# Patient Record
Sex: FEMALE | Race: WHITE | NOT HISPANIC OR LATINO | Employment: FULL TIME | URBAN - METROPOLITAN AREA
[De-identification: names, ages, dates, MRNs, and addresses within clinical notes are randomized per-mention and may not be internally consistent; named-entity substitution may affect disease eponyms.]

---

## 2021-03-31 ENCOUNTER — OFFICE VISIT (OUTPATIENT)
Dept: FAMILY MEDICINE CLINIC | Facility: CLINIC | Age: 51
End: 2021-03-31
Payer: COMMERCIAL

## 2021-03-31 VITALS
HEIGHT: 63 IN | WEIGHT: 94.2 LBS | DIASTOLIC BLOOD PRESSURE: 70 MMHG | SYSTOLIC BLOOD PRESSURE: 100 MMHG | HEART RATE: 66 BPM | OXYGEN SATURATION: 99 % | TEMPERATURE: 98.2 F | BODY MASS INDEX: 16.69 KG/M2

## 2021-03-31 DIAGNOSIS — R13.10 DIFFICULTY SWALLOWING SOLIDS: ICD-10-CM

## 2021-03-31 DIAGNOSIS — E03.9 ACQUIRED HYPOTHYROIDISM: ICD-10-CM

## 2021-03-31 DIAGNOSIS — K13.79 ORAL BLEEDING: ICD-10-CM

## 2021-03-31 DIAGNOSIS — Z00.00 ANNUAL PHYSICAL EXAM: Primary | ICD-10-CM

## 2021-03-31 DIAGNOSIS — R63.6 UNDERWEIGHT: ICD-10-CM

## 2021-03-31 DIAGNOSIS — T78.2XXA ANAPHYLAXIS, INITIAL ENCOUNTER: ICD-10-CM

## 2021-03-31 DIAGNOSIS — Z12.11 COLON CANCER SCREENING: ICD-10-CM

## 2021-03-31 DIAGNOSIS — Z12.31 ENCOUNTER FOR SCREENING MAMMOGRAM FOR MALIGNANT NEOPLASM OF BREAST: ICD-10-CM

## 2021-03-31 PROCEDURE — 99386 PREV VISIT NEW AGE 40-64: CPT | Performed by: FAMILY MEDICINE

## 2021-03-31 PROCEDURE — 99204 OFFICE O/P NEW MOD 45 MIN: CPT | Performed by: FAMILY MEDICINE

## 2021-03-31 PROCEDURE — 3008F BODY MASS INDEX DOCD: CPT | Performed by: FAMILY MEDICINE

## 2021-03-31 PROCEDURE — 3725F SCREEN DEPRESSION PERFORMED: CPT | Performed by: FAMILY MEDICINE

## 2021-03-31 PROCEDURE — 1036F TOBACCO NON-USER: CPT | Performed by: FAMILY MEDICINE

## 2021-03-31 RX ORDER — EPINEPHRINE 0.3 MG/.3ML
0.3 INJECTION SUBCUTANEOUS ONCE
Qty: 0.6 ML | Refills: 0 | Status: SHIPPED | OUTPATIENT
Start: 2021-03-31 | End: 2021-10-18 | Stop reason: SDUPTHER

## 2021-03-31 RX ORDER — ZINC GLUCONATE 50 MG
50 TABLET ORAL 2 TIMES DAILY
COMMUNITY

## 2021-03-31 NOTE — PROGRESS NOTES
Kiet Drew 1970 female MRN: [id number]  [address]    Visit to Establish Care: Family Medicine    Assessment/Plan     Underweight  BMI 16 69 today  Recommend the patient increase oral caloric intake, reduce cardio workouts per week to increase weight, and allow for addition of resistance training    Acquired hypothyroidism  Historically  Obtain new labs     Difficulty swallowing solids  Would consider swallowing study  Will re-eval after completion of labs     Pam was seen today for annual exam and establish care  Diagnoses and all orders for this visit:    Annual physical exam    Encounter for screening mammogram for malignant neoplasm of breast  -     Mammo screening bilateral w 3d & cad; Future    Underweight  -     TSH, 3rd generation with Free T4 reflex; Future  -     Lipid Panel with Direct LDL reflex; Future  -     CBC; Future  -     Comprehensive metabolic panel; Future  -     Vitamin D 25 hydroxy; Future  -     Vitamin B12; Future    Anaphylaxis, initial encounter  -     EPINEPHrine (EPIPEN) 0 3 mg/0 3 mL SOAJ; Inject 0 3 mL (0 3 mg total) into a muscle once for 1 dose    Colon cancer screening  -     Cologuard; Future    Oral bleeding  -     Protime-INR; Future    Acquired hypothyroidism    Difficulty swallowing solids      In addition to the above, the patient was counseled on general preventative health care subjects, including but not limited to:  - Nutrition, healthy weight, aerobic and weight-bearing exercise  - Mental health, social support, and self care  - Advised of the importance of dental hygiene and routine dental visits   - Patient made aware of  services at the office      Future Appointments   Date Time Provider Fernando Carroll   4/5/2021 11:00 AM AN MAMMO 1  W Becki Otero   4/27/2021 11:00 AM MD FAWN Vicente [facility]   4/4/2022  8:00 AM MD FAWN Vicente MD  [facility] [address]  3/31/2021      Please be aware that this note contains text that was dictated and there may be errors pertaining to "sound-alike" words during the dictation process  SUBJECTIVE    HPI:  Dutch Garg is a 44 y o  female who presented to establish care with this family medicine practice  She has several chronic issues:    Hypothyroidism  She states she now follows with a functional medicine specialist, who stopped her levothyroxine  Initially she had some hair loss and increased cold sensitivity, but those symptoms gradually resolved  Now she feels well  She has been a runner since a teen, but now she speed walks to be gentler on her joints  She does 5+ miles daily x 6 days a week  She doesn't do yoga, lifting  She is perimenopausal  Had hot flashes 2 years ago, now just menstrual irregularity every 3-6 months  Does have history ovarian cysts  Did have fertility challenges but was successful with IUI by Dr Raffy Barr  P1 daughter placenta previa  P2 son no complications  She notes at times her food gets stuck in her throat  Only happens with solids, happens with harder substances like nuts and pretzels  Never with liquids  Never chokes  Did also have an episode where she scratched the palate and then had significant bleeding necessitating ER visit  Sensitive to a lot of things, anaphylaxis to several and doesn't have epipen now  Gets rashes if stressed or has exposures  Review of Systems   Constitutional: Negative for activity change, chills, fever and unexpected weight change (difficulty gaining weight)  HENT: Negative for congestion, rhinorrhea and sore throat  Eyes: Negative for visual disturbance  Respiratory: Negative for cough, shortness of breath and wheezing  Cardiovascular: Negative for chest pain and palpitations  Gastrointestinal: Positive for anal bleeding (twice a year associated with firm pellet stools)   Negative for abdominal pain, blood in stool, constipation, diarrhea, nausea and vomiting  Genitourinary: Positive for menstrual problem  Negative for dyspareunia, dysuria and vaginal discharge  Musculoskeletal: Negative for arthralgias and myalgias  Skin: Negative for rash  Neurological: Negative for dizziness, weakness and headaches  Psychiatric/Behavioral: Negative for dysphoric mood  All other systems reviewed and are negative        Historical Information   Past Medical History:   Diagnosis Date    Allergic     Disease of thyroid gland      Past Surgical History:   Procedure Laterality Date    RHINOPLASTY      SINUS SURGERY      WISDOM TOOTH EXTRACTION       Family History   Problem Relation Age of Onset    Diabetes Mother     Thyroid disease Mother     Dementia Father     Heart disease Father     Heart failure Father     Thyroid disease Father     Breast cancer Sister     Stroke Maternal Grandmother     COPD Maternal Grandfather     Dementia Paternal Grandmother     Alzheimer's disease Paternal Grandmother     Colon cancer Neg Hx     Mental illness Neg Hx     Substance Abuse Neg Hx     Depression Neg Hx      Social History     Socioeconomic History    Marital status: /Civil Union     Spouse name: Nick    Number of children: 2    Years of education: Not on file    Highest education level: Not on file   Occupational History    Not on file   Social Needs    Financial resource strain: Not on file    Food insecurity     Worry: Not on file     Inability: Not on file    Transportation needs     Medical: No     Non-medical: No   Tobacco Use    Smoking status: Never Smoker    Smokeless tobacco: Never Used   Substance and Sexual Activity    Alcohol use: Never     Frequency: Never     Binge frequency: Never    Drug use: Never    Sexual activity: Yes     Partners: Male     Birth control/protection: None   Lifestyle    Physical activity     Days per week: 6 days     Minutes per session: 60 min    Stress: Only a little   Relationships    Social connections     Talks on phone: Not on file     Gets together: Not on file     Attends Jainism service: Not on file     Active member of club or organization: Not on file     Attends meetings of clubs or organizations: Not on file     Relationship status:     Intimate partner violence     Fear of current or ex partner: No     Emotionally abused: No     Physically abused: No     Forced sexual activity: No   Other Topics Concern    Not on file   Social History Narrative    Not on file     OB History        2    Para   2    Term   2       0    AB   0    Living   2       SAB   0    TAB   0    Ectopic   0    Multiple   0    Live Births   2                   Medications:      Current Outpatient Medications:     EPINEPHrine (EPIPEN) 0 3 mg/0 3 mL SOAJ, Inject 0 3 mL (0 3 mg total) into a muscle once for 1 dose, Disp: 0 6 mL, Rfl: 0    Allergies   Allergen Reactions    Cinnamon - Food Allergy Anaphylaxis    Coconut Fatty Acids - Food Allergy Anaphylaxis    Fish Allergy - Food Allergy Anaphylaxis     Most Recent Immunizations   Administered Date(s) Administered    H1N1, All Formulations 10/29/2009     OBJECTIVE  Vitals:   Vitals:    21 0846   BP: 100/70   Pulse: 66   Temp: 98 2 °F (36 8 °C)   SpO2: 99%   Weight: 42 7 kg (94 lb 3 2 oz)   Height: 5' 3" (1 6 m)     Wt Readings from Last 3 Encounters:   21 42 7 kg (94 lb 3 2 oz)   11/15/14 45 4 kg (100 lb)     Body mass index is 16 69 kg/m²  BP Readings from Last 3 Encounters:   21 100/70   11/15/14 110/70     Patient's last menstrual period was 2020 (approximate)  Physical Exam  Vitals signs and nursing note reviewed  Constitutional:       General: She is not in acute distress  Appearance: Normal appearance  She is underweight  She is not ill-appearing  HENT:      Head: Normocephalic and atraumatic        Right Ear: Tympanic membrane, ear canal and external ear normal       Left Ear: Tympanic membrane, ear canal and external ear normal       Nose: Nose normal       Mouth/Throat:      Pharynx: Uvula midline  No oropharyngeal exudate  Tonsils: No tonsillar exudate  Eyes:      Conjunctiva/sclera: Conjunctivae normal    Neck:      Thyroid: No thyromegaly  Cardiovascular:      Rate and Rhythm: Normal rate and regular rhythm  Heart sounds: Normal heart sounds  No murmur  Pulmonary:      Effort: Pulmonary effort is normal  No respiratory distress  Breath sounds: Normal breath sounds  No decreased breath sounds, wheezing, rhonchi or rales  Abdominal:      General: Bowel sounds are normal  There is no distension  Palpations: Abdomen is soft  Tenderness: There is no abdominal tenderness  Lymphadenopathy:      Cervical: No cervical adenopathy  Skin:     General: Skin is warm and dry  Capillary Refill: Capillary refill takes less than 2 seconds  Neurological:      Mental Status: She is alert and oriented to person, place, and time  Sensory: No sensory deficit  Motor: No abnormal muscle tone  Deep Tendon Reflexes: Reflexes normal         Lab:  I have personally reviewed all pertinent results  Imaging:  I have personally reviewed all pertinent results

## 2021-03-31 NOTE — ASSESSMENT & PLAN NOTE
BMI 16 69 today  Recommend the patient increase oral caloric intake, reduce cardio workouts per week to increase weight, and allow for addition of resistance training

## 2021-03-31 NOTE — PATIENT INSTRUCTIONS

## 2021-03-31 NOTE — PROGRESS NOTES
[facility]    NAME: Arun Baltazar  AGE: 44 y o  SEX: female  : 1970  DATE: 3/31/2021     Assessment and Plan:   Immunizations and preventive care screenings were discussed with patient today  Appropriate education was printed on patient's after visit summary  Counseling:  Dental Health: discussed importance of regular tooth brushing, flossing, and dental visits  Injury prevention: discussed safety/seat belts, safety helmets, smoke detectors, carbon dioxide detectors, and smoking near bedding or upholstery  · Exercise: the importance of regular exercise/physical activity was discussed  Recommend exercise 3-5 times per week for at least 30 minutes  BMI Counseling: Body mass index is 16 69 kg/m²  The BMI is below normal  Patient advised to gain weight and dietary education for weight gain was provided  Patient referred to PCP due to patient being underweight  Return in about 1 year (around 3/31/2022) for Annual physical      Chief Complaint:     Chief Complaint   Patient presents with    Annual Exam    Establish Care      History of Present Illness:     Adult Annual Physical   Patient here for a comprehensive physical exam  The patient reports problems - see other note  Diet and Physical Activity  · Diet/Nutrition: well balanced diet, limited junk food, consuming 3-5 servings of fruits/vegetables daily and adequate fiber intake  · Exercise: walking, 5-7 times a week on average and 1-2 hours on average  Depression Screening  PHQ-9 Depression Screening    PHQ-9:   Frequency of the following problems over the past two weeks:      Little interest or pleasure in doing things: 0 - not at all  Feeling down, depressed, or hopeless: 0 - not at all  PHQ-2 Score: 0       General Health  · Sleep: sleeps well  · Hearing: normal - bilateral   · Vision: no vision problems  · Dental: regular dental visits  /GYN Health  · Patient is: perimenopausal  · Last menstrual period: Patient's last menstrual period was 11/30/2020 (approximate)  Review of Systems:     Review of Systems   Constitutional: Negative for activity change, chills and fever  HENT: Negative for congestion, rhinorrhea and sore throat  Eyes: Negative for visual disturbance  Respiratory: Negative for cough, shortness of breath and wheezing  Cardiovascular: Negative for chest pain and palpitations  Gastrointestinal: Negative for abdominal pain, blood in stool, constipation, diarrhea, nausea and vomiting  Genitourinary: Negative for dysuria  Musculoskeletal: Negative for arthralgias and myalgias  Skin: Negative for rash  Neurological: Negative for dizziness, weakness and headaches  Psychiatric/Behavioral: Negative for dysphoric mood  All other systems reviewed and are negative       Past Medical History:     Past Medical History:   Diagnosis Date    Allergic     Disease of thyroid gland       Past Surgical History:     Past Surgical History:   Procedure Laterality Date    RHINOPLASTY      SINUS SURGERY      WISDOM TOOTH EXTRACTION        Social History:     E-Cigarette/Vaping    E-Cigarette Use Never User      E-Cigarette/Vaping Substances    Nicotine No     THC No     CBD No     Flavoring No     Other No     Unknown No      Social History     Socioeconomic History    Marital status: /Civil Union     Spouse name: Nick    Number of children: 2    Years of education: None    Highest education level: None   Occupational History    None   Social Needs    Financial resource strain: None    Food insecurity     Worry: None     Inability: None    Transportation needs     Medical: No     Non-medical: No   Tobacco Use    Smoking status: Never Smoker    Smokeless tobacco: Never Used   Substance and Sexual Activity    Alcohol use: Never     Frequency: Never     Binge frequency: Never    Drug use: Never    Sexual activity: Yes     Partners: Male     Birth control/protection: None   Lifestyle    Physical activity     Days per week: 6 days     Minutes per session: 60 min    Stress: Only a little   Relationships    Social connections     Talks on phone: None     Gets together: None     Attends Rastafari service: None     Active member of club or organization: None     Attends meetings of clubs or organizations: None     Relationship status:     Intimate partner violence     Fear of current or ex partner: No     Emotionally abused: No     Physically abused: No     Forced sexual activity: No   Other Topics Concern    None   Social History Narrative    None      Family History:     Family History   Problem Relation Age of Onset    Diabetes Mother     Thyroid disease Mother     Dementia Father     Heart disease Father     Heart failure Father     Thyroid disease Father     Breast cancer Sister     Stroke Maternal Grandmother     COPD Maternal Grandfather     Dementia Paternal Grandmother     Alzheimer's disease Paternal Grandmother     Colon cancer Neg Hx     Mental illness Neg Hx     Substance Abuse Neg Hx     Depression Neg Hx       Current Medications:     Current Outpatient Medications   Medication Sig Dispense Refill    EPINEPHrine (EPIPEN) 0 3 mg/0 3 mL SOAJ Inject 0 3 mL (0 3 mg total) into a muscle once for 1 dose 0 6 mL 0     No current facility-administered medications for this visit  Allergies: Allergies   Allergen Reactions    Cinnamon - Food Allergy Anaphylaxis    Coconut Fatty Acids - Food Allergy Anaphylaxis    Fish Allergy - Food Allergy Anaphylaxis      Physical Exam:     /70   Pulse 66   Temp 98 2 °F (36 8 °C)   Ht 5' 3" (1 6 m)   Wt 42 7 kg (94 lb 3 2 oz)   LMP 11/30/2020 (Approximate)   SpO2 99%   BMI 16 69 kg/m²     BMI Counseling: Body mass index is 16 69 kg/m²  The BMI is below normal  Patient was advised to gain weight   Dietary education for weight gain was provided to the patient today  Physical Exam  Vitals signs and nursing note reviewed  Constitutional:       General: She is not in acute distress  Appearance: Normal appearance  She is underweight  She is not ill-appearing  HENT:      Head: Normocephalic and atraumatic  Right Ear: Tympanic membrane, ear canal and external ear normal  No middle ear effusion  Left Ear: Tympanic membrane, ear canal and external ear normal   No middle ear effusion  Nose: Nose normal  No congestion or rhinorrhea  Mouth/Throat:      Lips: Pink  Mouth: Mucous membranes are moist       Pharynx: Oropharynx is clear  Uvula midline  No oropharyngeal exudate  Tonsils: No tonsillar exudate  Eyes:      General: Lids are normal       Extraocular Movements: Extraocular movements intact  Conjunctiva/sclera: Conjunctivae normal       Pupils: Pupils are equal, round, and reactive to light  Neck:      Thyroid: No thyromegaly  Trachea: No tracheal deviation  Cardiovascular:      Rate and Rhythm: Normal rate and regular rhythm  Pulses: Normal pulses  Heart sounds: Normal heart sounds, S1 normal and S2 normal  No murmur  Pulmonary:      Effort: Pulmonary effort is normal  No respiratory distress  Breath sounds: Normal breath sounds  No decreased breath sounds, wheezing, rhonchi or rales  Abdominal:      General: Bowel sounds are normal  There is no distension  Palpations: Abdomen is soft  Tenderness: There is no abdominal tenderness  Musculoskeletal:      Right lower leg: No edema  Left lower leg: No edema  Lymphadenopathy:      Cervical: No cervical adenopathy  Skin:     General: Skin is warm and dry  Capillary Refill: Capillary refill takes less than 2 seconds  Neurological:      Mental Status: She is alert and oriented to person, place, and time  Cranial Nerves: Cranial nerves are intact        Deep Tendon Reflexes: Reflexes normal       Reflex Scores:       Patellar reflexes are 2+ on the right side and 2+ on the left side  Psychiatric:         Attention and Perception: Attention normal          Mood and Affect: Mood normal          Thought Content: Thought content does not include suicidal ideation          MD Raul KohliSierra Vista Regional [facility] Center

## 2021-04-05 ENCOUNTER — HOSPITAL ENCOUNTER (OUTPATIENT)
Dept: MAMMOGRAPHY | Facility: HOSPITAL | Age: 51
Discharge: HOME | End: 2021-04-05
Payer: COMMERCIAL

## 2021-04-05 VITALS — WEIGHT: 94 LBS | HEIGHT: 63 IN | BODY MASS INDEX: 16.66 KG/M2

## 2021-04-05 DIAGNOSIS — Z12.31 ENCOUNTER FOR SCREENING MAMMOGRAM FOR MALIGNANT NEOPLASM OF BREAST: ICD-10-CM

## 2021-04-05 PROCEDURE — 77063 BREAST TOMOSYNTHESIS BI: CPT

## 2021-04-05 PROCEDURE — 77067 SCR MAMMO BI INCL CAD: CPT

## 2021-04-15 DIAGNOSIS — Z23 ENCOUNTER FOR IMMUNIZATION: ICD-10-CM

## 2021-05-08 ENCOUNTER — IMMUNIZATIONS (OUTPATIENT)
Dept: FAMILY MEDICINE CLINIC | Facility: HOSPITAL | Age: 51
End: 2021-05-08

## 2021-05-08 DIAGNOSIS — Z23 ENCOUNTER FOR IMMUNIZATION: Primary | ICD-10-CM

## 2021-05-08 PROCEDURE — 0001A SARS-COV-2 / COVID-19 MRNA VACCINE (PFIZER-BIONTECH) 30 MCG: CPT

## 2021-05-08 PROCEDURE — 91300 SARS-COV-2 / COVID-19 MRNA VACCINE (PFIZER-BIONTECH) 30 MCG: CPT

## 2021-05-29 ENCOUNTER — IMMUNIZATIONS (OUTPATIENT)
Dept: FAMILY MEDICINE CLINIC | Facility: HOSPITAL | Age: 51
End: 2021-05-29

## 2021-05-29 DIAGNOSIS — Z23 ENCOUNTER FOR IMMUNIZATION: Primary | ICD-10-CM

## 2021-05-29 PROCEDURE — 91300 SARS-COV-2 / COVID-19 MRNA VACCINE (PFIZER-BIONTECH) 30 MCG: CPT

## 2021-05-29 PROCEDURE — 0002A SARS-COV-2 / COVID-19 MRNA VACCINE (PFIZER-BIONTECH) 30 MCG: CPT

## 2021-10-18 ENCOUNTER — ANNUAL EXAM (OUTPATIENT)
Dept: FAMILY MEDICINE CLINIC | Facility: CLINIC | Age: 51
End: 2021-10-18
Payer: COMMERCIAL

## 2021-10-18 VITALS
OXYGEN SATURATION: 100 % | WEIGHT: 92.8 LBS | TEMPERATURE: 96.5 F | SYSTOLIC BLOOD PRESSURE: 110 MMHG | BODY MASS INDEX: 16.44 KG/M2 | HEIGHT: 63 IN | DIASTOLIC BLOOD PRESSURE: 80 MMHG | HEART RATE: 70 BPM

## 2021-10-18 DIAGNOSIS — Z01.411 ENCOUNTER FOR GYNECOLOGICAL EXAMINATION WITH ABNORMAL FINDING: Primary | ICD-10-CM

## 2021-10-18 DIAGNOSIS — Z12.31 BREAST CANCER SCREENING BY MAMMOGRAM: ICD-10-CM

## 2021-10-18 DIAGNOSIS — T78.2XXA ANAPHYLAXIS, INITIAL ENCOUNTER: ICD-10-CM

## 2021-10-18 DIAGNOSIS — N94.10 FEMALE DYSPAREUNIA: ICD-10-CM

## 2021-10-18 DIAGNOSIS — Z12.4 CERVICAL CANCER SCREENING: ICD-10-CM

## 2021-10-18 DIAGNOSIS — K59.00 CONSTIPATION, UNSPECIFIED CONSTIPATION TYPE: ICD-10-CM

## 2021-10-18 PROCEDURE — G0145 SCR C/V CYTO,THINLAYER,RESCR: HCPCS | Performed by: FAMILY MEDICINE

## 2021-10-18 PROCEDURE — 99214 OFFICE O/P EST MOD 30 MIN: CPT | Performed by: FAMILY MEDICINE

## 2021-10-18 PROCEDURE — G0476 HPV COMBO ASSAY CA SCREEN: HCPCS | Performed by: FAMILY MEDICINE

## 2021-10-18 PROCEDURE — 3008F BODY MASS INDEX DOCD: CPT | Performed by: FAMILY MEDICINE

## 2021-10-18 PROCEDURE — 1036F TOBACCO NON-USER: CPT | Performed by: FAMILY MEDICINE

## 2021-10-18 RX ORDER — EPINEPHRINE 0.3 MG/.3ML
0.3 INJECTION SUBCUTANEOUS ONCE
Qty: 0.6 ML | Refills: 0 | Status: SHIPPED | OUTPATIENT
Start: 2021-10-18 | End: 2022-04-04 | Stop reason: SDUPTHER

## 2021-10-20 LAB
HPV HR 12 DNA CVX QL NAA+PROBE: NEGATIVE
HPV16 DNA CVX QL NAA+PROBE: NEGATIVE
HPV18 DNA CVX QL NAA+PROBE: NEGATIVE

## 2021-10-21 LAB
LAB AP GYN PRIMARY INTERPRETATION: NORMAL
Lab: NORMAL

## 2021-12-28 ENCOUNTER — IMMUNIZATIONS (OUTPATIENT)
Dept: FAMILY MEDICINE CLINIC | Facility: HOSPITAL | Age: 51
End: 2021-12-28

## 2021-12-28 DIAGNOSIS — Z23 ENCOUNTER FOR IMMUNIZATION: Primary | ICD-10-CM

## 2021-12-28 PROCEDURE — 0064A COVID-19 MODERNA VACC 0.25 ML BOOSTER: CPT

## 2021-12-28 PROCEDURE — 91306 COVID-19 MODERNA VACC 0.25 ML BOOSTER: CPT

## 2022-01-04 ENCOUNTER — TELEPHONE (OUTPATIENT)
Dept: FAMILY MEDICINE CLINIC | Facility: CLINIC | Age: 52
End: 2022-01-04

## 2022-01-05 ENCOUNTER — OFFICE VISIT (OUTPATIENT)
Dept: FAMILY MEDICINE CLINIC | Facility: CLINIC | Age: 52
End: 2022-01-05
Payer: COMMERCIAL

## 2022-01-05 VITALS
SYSTOLIC BLOOD PRESSURE: 96 MMHG | HEART RATE: 64 BPM | WEIGHT: 95 LBS | BODY MASS INDEX: 16.83 KG/M2 | OXYGEN SATURATION: 96 % | TEMPERATURE: 96.6 F | HEIGHT: 63 IN | DIASTOLIC BLOOD PRESSURE: 60 MMHG

## 2022-01-05 DIAGNOSIS — R00.2 PALPITATION: Primary | ICD-10-CM

## 2022-01-05 PROCEDURE — 93000 ELECTROCARDIOGRAM COMPLETE: CPT | Performed by: NURSE PRACTITIONER

## 2022-01-05 PROCEDURE — 99214 OFFICE O/P EST MOD 30 MIN: CPT | Performed by: NURSE PRACTITIONER

## 2022-01-05 PROCEDURE — 1036F TOBACCO NON-USER: CPT | Performed by: NURSE PRACTITIONER

## 2022-01-05 PROCEDURE — 3008F BODY MASS INDEX DOCD: CPT | Performed by: NURSE PRACTITIONER

## 2022-01-05 NOTE — PROGRESS NOTES
Assessment/Plan:     Diagnoses and all orders for this visit:    Palpitation  -     POCT ECG  -     Holter monitor; Future        Discussed with patient that her ECG was within normal limitations  Discussed with patient plan to obtain a Holter monitor for further evaluate her palpitations  Patient instructed to call if no improvement in 72 hours or symptoms worsen    Subjective:      Patient ID: Zoey Jay is a 46 y o  female  46 y  o female presenting with palpitations for the past few days  Patient reports that she obtained her COVID vaccine and since that time has been having intermittent episodes of palpitations  She first thought it was related to caffeine intake so decreased amount of caffeine  She reports that she gets up in the morning and does her exercises and is fine but once she drinks her half regular and half decaf coffee the palpitations start again  She reports that her  has been having some cardiac epsiodes since the vaccine also  Patient does have some underlaying anxiety flares         Family History   Problem Relation Age of Onset    Diabetes Mother     Thyroid disease Mother     Dementia Father     Heart disease Father     Heart failure Father     Thyroid disease Father     Breast cancer Sister     Stroke Maternal Grandmother     COPD Maternal Grandfather     Dementia Paternal Grandmother     Alzheimer's disease Paternal Grandmother     No Known Problems Daughter     No Known Problems Maternal Aunt     Colon cancer Neg Hx     Mental illness Neg Hx     Substance Abuse Neg Hx     Depression Neg Hx      Social History     Socioeconomic History    Marital status: /Civil Union     Spouse name: Nick    Number of children: 2    Years of education: Not on file    Highest education level: Not on file   Occupational History    Not on file   Tobacco Use    Smoking status: Never Smoker    Smokeless tobacco: Never Used   Vaping Use    Vaping Use: Never used Substance and Sexual Activity    Alcohol use: Never    Drug use: Never    Sexual activity: Yes     Partners: Male     Birth control/protection: None   Other Topics Concern    Not on file   Social History Narrative    Not on file     Social Determinants of Health     Financial Resource Strain: Not on file   Food Insecurity: Not on file   Transportation Needs: No Transportation Needs    Lack of Transportation (Medical): No    Lack of Transportation (Non-Medical): No   Physical Activity: Sufficiently Active    Days of Exercise per Week: 6 days    Minutes of Exercise per Session: 60 min   Stress: No Stress Concern Present    Feeling of Stress :  Only a little   Social Connections: Unknown    Frequency of Communication with Friends and Family: Not on file    Frequency of Social Gatherings with Friends and Family: Not on file    Attends [employer] Services: Not on file    Active Member of Clubs or Organizations: Not on file    Attends Club or Organization Meetings: Not on file    Marital Status:    Intimate Partner Violence: Not At Risk    Fear of Current or Ex-Partner: No    Emotionally Abused: No    Physically Abused: No    Sexually Abused: No   Housing Stability: Not on file     E-Cigarette/Vaping    E-Cigarette Use Never User      E-Cigarette/Vaping Substances    Nicotine No     THC No     CBD No     Flavoring No     Other No     Unknown No      Past Medical History:   Diagnosis Date    Allergic     Disease of thyroid gland      Past Surgical History:   Procedure Laterality Date    BREAST BIOPSY Left     2yrs ago    RHINOPLASTY      SINUS SURGERY      US GUIDED BREAST BIOPSY LEFT COMPLETE Left 5/1/2019    WISDOM TOOTH EXTRACTION       Allergies   Allergen Reactions    Cinnamon - Food Allergy Anaphylaxis    Coconut Fatty Acids - Food Allergy Anaphylaxis    Fish Allergy - Food Allergy Anaphylaxis       Current Outpatient Medications:     Acetylcysteine (NAC PO), Take 1 tablet by mouth daily at bedtime, Disp: , Rfl:     Ascorbic Acid (VITAMIN C PO), Take by mouth Take 1 in the morning and 2 with dinner, Disp: , Rfl:     B Complex Vitamins (B COMPLEX PO), Take by mouth 2 pumps daily, Disp: , Rfl:     BIOTIN PO, Take 1 tablet by mouth daily, Disp: , Rfl:     BLACK CURRANT SEED OIL PO, Take 1 tablet by mouth 2 (two) times a day, Disp: , Rfl:     Cholecalciferol (VITAMIN D3 PO), Take by mouth Take 4 drops daily, Disp: , Rfl:     COLLAGEN PO, Take 2 Scoops by mouth daily, Disp: , Rfl:     ECHINACEA HERB PO, Take 1 tablet by mouth 3 (three) times a day as needed, Disp: , Rfl:     Flax OIL, Take by mouth, Disp: , Rfl:     LUTEIN PO, Take 1 tablet by mouth daily, Disp: , Rfl:     LYSINE ACETATE PO, Take 2 tablets by mouth 2 (two) times a day, Disp: , Rfl:     Multiple Vitamins-Iron (CHLORELLA PO), Take 3 tablets by mouth daily at bedtime, Disp: , Rfl:     Probiotic Product (PROBIOTIC DAILY PO), Take 1 tablet by mouth daily at bedtime, Disp: , Rfl:     QUERCETIN PO, Take 1 tablet by mouth 2 (two) times a day, Disp: , Rfl:     THEANINE PO, Take 1 tablet by mouth daily at bedtime, Disp: , Rfl:     zinc gluconate 50 mg tablet, Take 50 mg by mouth 2 (two) times a day, Disp: , Rfl:     EPINEPHrine (EPIPEN) 0 3 mg/0 3 mL SOAJ, Inject 0 3 mL (0 3 mg total) into a muscle once for 1 dose, Disp: 0 6 mL, Rfl: 0    Review of Systems   Constitutional: Negative for chills, fatigue and fever  HENT: Negative for dental problem, ear pain, hearing loss, nosebleeds, sneezing, sore throat, tinnitus and trouble swallowing  Respiratory: Negative for cough, chest tightness, shortness of breath and wheezing  Cardiovascular: Positive for palpitations  Negative for chest pain and leg swelling  Gastrointestinal: Negative for abdominal distention, abdominal pain, constipation, diarrhea and nausea  Musculoskeletal: Negative for myalgias  Skin: Negative for color change and rash     Neurological: Negative for dizziness, weakness, light-headedness and headaches  Psychiatric/Behavioral: Negative for dysphoric mood and sleep disturbance  The patient is nervous/anxious  Objective:    BP 96/60   Pulse 64   Temp (!) 96 6 °F (35 9 °C)   Ht 5' 3" (1 6 m)   Wt 43 1 kg (95 lb)   SpO2 96%   BMI 16 83 kg/m² (Reviewed)     Physical Exam  Vitals reviewed  Constitutional:       General: She is not in acute distress  Appearance: She is well-developed and well-groomed  She is not ill-appearing  HENT:      Head: Normocephalic and atraumatic  Right Ear: External ear normal       Left Ear: External ear normal       Nose:      Comments: Patient had a facial covering in place as per office protocol    Eyes:      General: Lids are normal       Extraocular Movements: Extraocular movements intact  Conjunctiva/sclera: Conjunctivae normal       Pupils: Pupils are equal, round, and reactive to light  Neck:      Thyroid: No thyromegaly or thyroid tenderness  Trachea: Trachea and phonation normal    Cardiovascular:      Rate and Rhythm: Normal rate and regular rhythm  Heart sounds: Normal heart sounds  Pulmonary:      Effort: Pulmonary effort is normal       Breath sounds: Normal breath sounds  Skin:     General: Skin is warm and dry  Capillary Refill: Capillary refill takes less than 2 seconds  Neurological:      General: No focal deficit present  Mental Status: She is alert and oriented to person, place, and time  Psychiatric:         Mood and Affect: Mood normal          Behavior: Behavior normal  Behavior is cooperative

## 2022-01-06 DIAGNOSIS — R00.2 PALPITATIONS: Primary | ICD-10-CM

## 2022-01-07 ENCOUNTER — APPOINTMENT (OUTPATIENT)
Dept: LAB | Facility: CLINIC | Age: 52
End: 2022-01-07
Payer: COMMERCIAL

## 2022-01-07 DIAGNOSIS — R00.2 PALPITATIONS: ICD-10-CM

## 2022-01-07 LAB
ANION GAP SERPL CALCULATED.3IONS-SCNC: 6 MMOL/L (ref 4–13)
BASOPHILS # BLD AUTO: 0.06 THOUSANDS/ΜL (ref 0–0.1)
BASOPHILS NFR BLD AUTO: 1 % (ref 0–1)
BUN SERPL-MCNC: 21 MG/DL (ref 5–25)
CALCIUM SERPL-MCNC: 11.7 MG/DL (ref 8.3–10.1)
CHLORIDE SERPL-SCNC: 104 MMOL/L (ref 100–108)
CO2 SERPL-SCNC: 28 MMOL/L (ref 21–32)
CREAT SERPL-MCNC: 0.91 MG/DL (ref 0.6–1.3)
EOSINOPHIL # BLD AUTO: 0.1 THOUSAND/ΜL (ref 0–0.61)
EOSINOPHIL NFR BLD AUTO: 2 % (ref 0–6)
ERYTHROCYTE [DISTWIDTH] IN BLOOD BY AUTOMATED COUNT: 12.3 % (ref 11.6–15.1)
GFR SERPL CREATININE-BSD FRML MDRD: 72 ML/MIN/1.73SQ M
GLUCOSE SERPL-MCNC: 70 MG/DL (ref 65–140)
HCT VFR BLD AUTO: 41.8 % (ref 34.8–46.1)
HGB BLD-MCNC: 13.6 G/DL (ref 11.5–15.4)
IMM GRANULOCYTES # BLD AUTO: 0.01 THOUSAND/UL (ref 0–0.2)
IMM GRANULOCYTES NFR BLD AUTO: 0 % (ref 0–2)
LYMPHOCYTES # BLD AUTO: 1.17 THOUSANDS/ΜL (ref 0.6–4.47)
LYMPHOCYTES NFR BLD AUTO: 28 % (ref 14–44)
MCH RBC QN AUTO: 30.3 PG (ref 26.8–34.3)
MCHC RBC AUTO-ENTMCNC: 32.5 G/DL (ref 31.4–37.4)
MCV RBC AUTO: 93 FL (ref 82–98)
MONOCYTES # BLD AUTO: 0.5 THOUSAND/ΜL (ref 0.17–1.22)
MONOCYTES NFR BLD AUTO: 12 % (ref 4–12)
NEUTROPHILS # BLD AUTO: 2.38 THOUSANDS/ΜL (ref 1.85–7.62)
NEUTS SEG NFR BLD AUTO: 57 % (ref 43–75)
NRBC BLD AUTO-RTO: 0 /100 WBCS
PLATELET # BLD AUTO: 249 THOUSANDS/UL (ref 149–390)
PMV BLD AUTO: 10.1 FL (ref 8.9–12.7)
POTASSIUM SERPL-SCNC: 4 MMOL/L (ref 3.5–5.3)
RBC # BLD AUTO: 4.49 MILLION/UL (ref 3.81–5.12)
SODIUM SERPL-SCNC: 138 MMOL/L (ref 136–145)
TSH SERPL DL<=0.05 MIU/L-ACNC: 2.48 UIU/ML (ref 0.36–3.74)
WBC # BLD AUTO: 4.22 THOUSAND/UL (ref 4.31–10.16)

## 2022-01-07 PROCEDURE — 80048 BASIC METABOLIC PNL TOTAL CA: CPT

## 2022-01-07 PROCEDURE — 85025 COMPLETE CBC W/AUTO DIFF WBC: CPT

## 2022-01-07 PROCEDURE — 84443 ASSAY THYROID STIM HORMONE: CPT

## 2022-01-07 PROCEDURE — 36415 COLL VENOUS BLD VENIPUNCTURE: CPT

## 2022-01-12 ENCOUNTER — HOSPITAL ENCOUNTER (OUTPATIENT)
Dept: NON INVASIVE DIAGNOSTICS | Facility: CLINIC | Age: 52
Discharge: HOME | End: 2022-01-12
Payer: COMMERCIAL

## 2022-01-12 DIAGNOSIS — R00.2 PALPITATION: ICD-10-CM

## 2022-01-12 PROCEDURE — 93226 XTRNL ECG REC<48 HR SCAN A/R: CPT

## 2022-01-12 PROCEDURE — 93225 XTRNL ECG REC<48 HRS REC: CPT

## 2022-01-20 ENCOUNTER — TELEPHONE (OUTPATIENT)
Dept: FAMILY MEDICINE CLINIC | Facility: CLINIC | Age: 52
End: 2022-01-20

## 2022-01-21 PROCEDURE — 93227 XTRNL ECG REC<48 HR R&I: CPT | Performed by: INTERNAL MEDICINE

## 2022-01-24 ENCOUNTER — TELEPHONE (OUTPATIENT)
Dept: OTHER | Facility: OTHER | Age: 52
End: 2022-01-24

## 2022-01-24 NOTE — TELEPHONE ENCOUNTER
Pt calling for results from holter monitor on 1/12/22, there is a result note from Dr Faye Sarmiento in there but the pt is unable to see it from her mychart for some reason, can we give her a call back to go over results? Thank you!

## 2022-01-25 NOTE — TELEPHONE ENCOUNTER
Tried calling pt back no answer  Message left to follow up with the office tomorrow with any more questions or concerns

## 2022-03-10 ENCOUNTER — TELEMEDICINE (OUTPATIENT)
Dept: FAMILY MEDICINE CLINIC | Facility: CLINIC | Age: 52
End: 2022-03-10
Payer: COMMERCIAL

## 2022-03-10 DIAGNOSIS — J01.00 ACUTE NON-RECURRENT MAXILLARY SINUSITIS: Primary | ICD-10-CM

## 2022-03-10 PROCEDURE — 99213 OFFICE O/P EST LOW 20 MIN: CPT | Performed by: FAMILY MEDICINE

## 2022-03-10 RX ORDER — DOXYCYCLINE HYCLATE 100 MG/1
100 CAPSULE ORAL EVERY 12 HOURS SCHEDULED
Qty: 14 CAPSULE | Refills: 0 | Status: SHIPPED | OUTPATIENT
Start: 2022-03-10 | End: 2022-03-17

## 2022-03-10 NOTE — PROGRESS NOTES
COVID-19 Outpatient Progress Note    Assessment/Plan:    Problem List Items Addressed This Visit     None      Visit Diagnoses     Acute non-recurrent maxillary sinusitis    -  Primary    Relevant Medications    doxycycline hyclate (VIBRAMYCIN) 100 mg capsule         Disposition:     After clarifying the patient's history, my suspicion for COVID-19 infection is very low  Counseled the patient regarding supportive care  They are to call or return to the office if not improving  I have spent 12 minutes directly with the patient  Greater than 50% of this time was spent in counseling/coordination of care regarding: prognosis, risks and benefits of treatment options, instructions for management, patient and family education, risk factor reductions and impressions  Encounter provider Ronda Butler MD    Provider located at [address]    Recent Visits  No visits were found meeting these conditions  Showing recent visits within past 7 days and meeting all other requirements  Today's Visits  Date Type Provider Dept   03/10/22 Telemedicine Ronda Butler MD [place]g [id number] Madisyn Chiang today's visits and meeting all other requirements  Future Appointments  No visits were found meeting these conditions  Showing future appointments within next 150 days and meeting all other requirements     This virtual check-in was done via [employer] [address] and patient was informed that this is a secure, HIPAA-compliant platform  She agrees to proceed  Patient agrees to participate in a virtual check in via telephone or video visit instead of presenting to the office to address urgent/immediate medical needs  Patient is aware this is a billable service  After connecting through [facility], the patient was identified by name and date of birth   Alver Serve was informed that this was a telemedicine visit and that the exam was being conducted confidentially over secure lines  My office door was closed  No one else was in the room  Bharati Foreman acknowledged consent and understanding of privacy and security of the telemedicine visit  I informed the patient that I have reviewed her record in Epic and presented the opportunity for her to ask any questions regarding the visit today  The patient agreed to participate  Verification of patient location:  Patient is located in the following state in which I hold an active license: [place]    Subjective:   Bharati Foreman is a 46 y o  female who is concerned about COVID-19  Patient's symptoms include chills, fatigue, malaise, sore throat, myalgias and headache  Patient denies fever, congestion, rhinorrhea, anosmia, loss of taste, cough, shortness of breath, chest tightness, abdominal pain, nausea, vomiting and diarrhea  - Date of symptom onset: 2/24/2022      COVID-19 vaccination status: Fully vaccinated with booster    Exposure:   Contact with a person who is under investigation (PUI) for or who is positive for COVID-19 within the last 14 days?: No    Hospitalized recently for fever and/or lower respiratory symptoms?: No      Currently a healthcare worker that is involved in direct patient care?: No      Works in a special setting where the risk of COVID-19 transmission may be high? (this may include long-term care, correctional and assisted facilities; homeless shelters; assisted-living facilities and group homes ): No      Resident in a special setting where the risk of COVID-19 transmission may be high? (this may include long-term care, correctional and assisted facilities; homeless shelters; assisted-living facilities and group homes ): No      Went to [facility] flu and Covid negative  Sinus pain with bending over  [place] Elo[place] started now with similar symptoms and coughing  She also notes she had a few days of eye congestion/green mucus, but this resolved   Notes she thinks she may have had a reaction to amoxicillin in the past      No results found for: [employer], SARSCORONAVI, CORONAVIRUSR, SARSCOVAG, [address]  Past Medical History:   Diagnosis Date    Allergic     Disease of thyroid gland      Past Surgical History:   Procedure Laterality Date    BREAST BIOPSY Left     2yrs ago    RHINOPLASTY      SINUS SURGERY      US GUIDED BREAST BIOPSY LEFT COMPLETE Left 5/1/2019    WISDOM TOOTH EXTRACTION       Current Outpatient Medications   Medication Sig Dispense Refill    Acetylcysteine (NAC PO) Take 1 tablet by mouth daily at bedtime      Ascorbic Acid (VITAMIN C PO) Take by mouth Take 1 in the morning and 2 with dinner      B Complex Vitamins (B COMPLEX PO) Take by mouth 2 pumps daily      BIOTIN PO Take 1 tablet by mouth daily      BLACK CURRANT SEED OIL PO Take 1 tablet by mouth 2 (two) times a day      Cholecalciferol (VITAMIN D3 PO) Take by mouth Take 4 drops daily      COLLAGEN PO Take 2 Scoops by mouth daily      doxycycline hyclate (VIBRAMYCIN) 100 mg capsule Take 1 capsule (100 mg total) by mouth every 12 (twelve) hours for 7 days 14 capsule 0    EPINEPHrine (EPIPEN) 0 3 mg/0 3 mL SOAJ Inject 0 3 mL (0 3 mg total) into a muscle once for 1 dose 0 6 mL 0    LUTEIN PO Take 1 tablet by mouth daily      LYSINE ACETATE PO Take 2 tablets by mouth 2 (two) times a day      Multiple Vitamins-Iron (CHLORELLA PO) Take 3 tablets by mouth daily at bedtime      Probiotic Product (PROBIOTIC DAILY PO) Take 1 tablet by mouth daily at bedtime      QUERCETIN PO Take 1 tablet by mouth 2 (two) times a day      zinc gluconate 50 mg tablet Take 50 mg by mouth 2 (two) times a day       No current facility-administered medications for this visit  Allergies   Allergen Reactions    Cinnamon - Food Allergy Anaphylaxis    Coconut Fatty Acids - Food Allergy Anaphylaxis    Fish Allergy - Food Allergy Anaphylaxis       Review of Systems   Constitutional: Positive for chills and fatigue   Negative for fever    HENT: Positive for sore throat  Negative for congestion and rhinorrhea  Respiratory: Negative for cough, chest tightness and shortness of breath  Gastrointestinal: Negative for abdominal pain, diarrhea, nausea and vomiting  Musculoskeletal: Positive for myalgias  Neurological: Positive for headaches  All other systems reviewed and are negative  Objective: There were no vitals filed for this visit  Physical Exam  Constitutional:       Appearance: Normal appearance  She is not ill-appearing, toxic-appearing or diaphoretic  HENT:      Head: Normocephalic and atraumatic  Eyes:      Conjunctiva/sclera: Conjunctivae normal    Pulmonary:      Effort: Pulmonary effort is normal  No respiratory distress  Skin:     Findings: No rash  Neurological:      General: No focal deficit present  Mental Status: She is alert  Mental status is at baseline  VIRTUAL VISIT DISCLAIMER    Abdullahi Little verbally agrees to participate in [employer]  Pt is aware that [employer] could be limited without vital signs or the ability to perform a full hands-on physical Nhung Wright understands she or the provider may request at any time to terminate the video visit and request the patient to seek care or treatment in person

## 2022-04-04 ENCOUNTER — OFFICE VISIT (OUTPATIENT)
Dept: FAMILY MEDICINE CLINIC | Facility: CLINIC | Age: 52
End: 2022-04-04
Payer: COMMERCIAL

## 2022-04-04 ENCOUNTER — TELEPHONE (OUTPATIENT)
Dept: FAMILY MEDICINE CLINIC | Facility: CLINIC | Age: 52
End: 2022-04-04

## 2022-04-04 VITALS
SYSTOLIC BLOOD PRESSURE: 116 MMHG | TEMPERATURE: 98.3 F | HEART RATE: 68 BPM | BODY MASS INDEX: 17.72 KG/M2 | WEIGHT: 100 LBS | DIASTOLIC BLOOD PRESSURE: 64 MMHG | HEIGHT: 63 IN

## 2022-04-04 DIAGNOSIS — T78.2XXA ANAPHYLAXIS, INITIAL ENCOUNTER: ICD-10-CM

## 2022-04-04 DIAGNOSIS — E44.0 MODERATE PROTEIN-CALORIE MALNUTRITION (HCC): ICD-10-CM

## 2022-04-04 DIAGNOSIS — Z00.00 ANNUAL PHYSICAL EXAM: Primary | ICD-10-CM

## 2022-04-04 DIAGNOSIS — T17.308A CHOKING, INITIAL ENCOUNTER: ICD-10-CM

## 2022-04-04 PROCEDURE — 3725F SCREEN DEPRESSION PERFORMED: CPT | Performed by: FAMILY MEDICINE

## 2022-04-04 PROCEDURE — 99396 PREV VISIT EST AGE 40-64: CPT | Performed by: FAMILY MEDICINE

## 2022-04-04 RX ORDER — EPINEPHRINE 0.3 MG/.3ML
0.3 INJECTION SUBCUTANEOUS ONCE
Qty: 0.6 ML | Refills: 0 | Status: SHIPPED | OUTPATIENT
Start: 2022-04-04 | End: 2022-04-04

## 2022-04-04 NOTE — PATIENT INSTRUCTIONS
Wellness Visit for Adults   AMBULATORY CARE:   A wellness visit  is when you see your healthcare provider to get screened for health problems  Your healthcare provider will also give you advice on how to stay healthy  Write down your questions so you remember to ask them  Ask your healthcare provider how often you should have a wellness visit  What happens at a wellness visit:  Your healthcare provider will ask about your health, and your family history of health problems  This includes high blood pressure, heart disease, and cancer  He or she will ask if you have symptoms that concern you, if you smoke, and about your mood  You may also be asked about your intake of medicines, supplements, food, and alcohol  Any of the following may be done:  · Your weight  will be checked  Your height may also be checked so your body mass index (BMI) can be calculated  Your BMI shows if you are at a healthy weight  · Your blood pressure  and heart rate will be checked  Your temperature may also be checked  · Blood and urine tests  may be done  Blood tests may be done to check your cholesterol levels  Abnormal cholesterol levels increase your risk for heart disease and stroke  You may also need a blood or urine test to check for diabetes if you are at increased risk  Urine tests may be done to look for signs of an infection or kidney disease  · A physical exam  includes checking your heartbeat and lungs with a stethoscope  Your healthcare provider may also check your skin to look for sun damage  · Screening tests  may be recommended  A screening test is done to check for diseases that may not cause symptoms  The screening tests you may need depend on your age, gender, family history, and lifestyle habits  For example, colorectal screening may be recommended if you are 48years old or older  Screening tests you need if you are a woman:   · A Pap smear  is used to screen for cervical cancer   Pap smears are usually done every 3 to 5 years depending on your age  You may need them more often if you have had abnormal Pap smear test results in the past  Ask your healthcare provider how often you should have a Pap smear  · A mammogram  is an x-ray of your breasts to screen for breast cancer  Experts recommend mammograms every 2 years starting at age 48 years  You may need a mammogram at age 52 years or younger if you have an increased risk for breast cancer  Talk to your healthcare provider about when you should start having mammograms and how often you need them  Vaccines you may need:   · Get an influenza vaccine  every year  The influenza vaccine protects you from the flu  Several types of viruses cause the flu  The viruses change over time, so new vaccines are made each year  · Get a tetanus-diphtheria (Td) booster vaccine  every 10 years  This vaccine protects you against tetanus and diphtheria  Tetanus is a severe infection that may cause painful muscle spasms and lockjaw  Diphtheria is a severe bacterial infection that causes a thick covering in the back of your mouth and throat  · Get a human papillomavirus (HPV) vaccine  if you are female and aged 23 to 32 or male 23 to 24 and never received it  This vaccine protects you from HPV infection  HPV is the most common infection spread by sexual contact  HPV may also cause vaginal, penile, and anal cancers  · Get a pneumococcal vaccine  if you are aged 72 years or older  The pneumococcal vaccine is an injection given to protect you from pneumococcal disease  Pneumococcal disease is an infection caused by pneumococcal bacteria  The infection may cause pneumonia, meningitis, or an ear infection  · Get a shingles vaccine  if you are 60 or older, even if you have had shingles before  The shingles vaccine is an injection to protect you from the varicella-zoster virus  This is the same virus that causes chickenpox   Shingles is a painful rash that develops in people who had chickenpox or have been exposed to the virus  How to eat healthy:  My Plate is a model for planning healthy meals  It shows the types and amounts of foods that should go on your plate  Fruits and vegetables make up about half of your plate, and grains and protein make up the other half  A serving of dairy is included on the side of your plate  The amount of calories and serving sizes you need depends on your age, gender, weight, and height  Examples of healthy foods are listed below:  · Eat a variety of vegetables  such as dark green, red, and orange vegetables  You can also include canned vegetables low in sodium (salt) and frozen vegetables without added butter or sauces  · Eat a variety of fresh fruits , canned fruit in 100% juice, frozen fruit, and dried fruit  · Include whole grains  At least half of the grains you eat should be whole grains  Examples include whole-wheat bread, wheat pasta, brown rice, and whole-grain cereals such as oatmeal     · Eat a variety of protein foods such as seafood (fish and shellfish), lean meat, and poultry without skin (turkey and chicken)  Examples of lean meats include pork leg, shoulder, or tenderloin, and beef round, sirloin, tenderloin, and extra lean ground beef  Other protein foods include eggs and egg substitutes, beans, peas, soy products, nuts, and seeds  · Choose low-fat dairy products such as skim or 1% milk or low-fat yogurt, cheese, and cottage cheese  · Limit unhealthy fats  such as butter, hard margarine, and shortening  Exercise:  Exercise at least 30 minutes per day on most days of the week  Some examples of exercise include walking, biking, dancing, and swimming  You can also fit in more physical activity by taking the stairs instead of the elevator or parking farther away from stores  Include muscle strengthening activities 2 days each week  Regular exercise provides many health benefits   It helps you manage your weight, and decreases your risk for type 2 diabetes, heart disease, stroke, and high blood pressure  Exercise can also help improve your mood  Ask your healthcare provider about the best exercise plan for you  General health and safety guidelines:   · Do not smoke  Nicotine and other chemicals in cigarettes and cigars can cause lung damage  Ask your healthcare provider for information if you currently smoke and need help to quit  E-cigarettes or smokeless tobacco still contain nicotine  Talk to your healthcare provider before you use these products  · Limit alcohol  A drink of alcohol is 12 ounces of beer, 5 ounces of wine, or 1½ ounces of liquor  · Lose weight, if needed  Being overweight increases your risk of certain health conditions  These include heart disease, high blood pressure, type 2 diabetes, and certain types of cancer  · Protect your skin  Do not sunbathe or use tanning beds  Use sunscreen with a SPF 15 or higher  Apply sunscreen at least 15 minutes before you go outside  Reapply sunscreen every 2 hours  Wear protective clothing, hats, and sunglasses when you are outside  · Drive safely  Always wear your seatbelt  Make sure everyone in your car wears a seatbelt  A seatbelt can save your life if you are in an accident  Do not use your cell phone when you are driving  This could distract you and cause an accident  Pull over if you need to make a call or send a text message  · Practice safe sex  Use latex condoms if are sexually active and have more than one partner  Your healthcare provider may recommend screening tests for sexually transmitted infections (STIs)  · Wear helmets, lifejackets, and protective gear  Always wear a helmet when you ride a bike or motorcycle, go skiing, or play sports that could cause a head injury  Wear protective equipment when you play sports  Wear a lifejacket when you are on a boat or doing water sports      © Copyright [employer] 2022 Information is for End User's use only and may not be sold, redistributed or otherwise used for commercial purposes  All illustrations and images included in CareNotes® are the copyrighted property of A D A M [employer]  or Derrick Serrano  The above information is an  only  It is not intended as medical advice for individual conditions or treatments  Talk to your doctor, nurse or pharmacist before following any medical regimen to see if it is safe and effective for you  Weight Gain Tips for Athletes   AMBULATORY CARE:   Why some athletes need to gain weight:  Some athletes need more calories to gain weight or maintain their weight  For example, you may need to maintain your weight for endurance sports because of the large amount of calories you burn  Endurance sports include running, swimming, and biking over long time periods or distances  Weight gain may also help to build muscle  This may help you if you participate in contact sports, such as football and hockey  A healthy weight gain goal  is about ½ to 1 pound each week  Gain weight slowly to avoid gaining too much body fat  An exercise program that includes strength training will help you gain muscle weight  Ask your dietitian how much weight gain is right for you  A healthy meal plan for an athlete  includes a variety of healthy foods during regular meals and snacks  The following are suggested amounts of fat, carbohydrate, and protein you may need each day  Your dietitian can tell you how many calories you need each day to gain weight  · Fat  is important because it provides energy and vitamins  You need 20% to 35% of your total daily calories to come from fat  For example, a man who needs about 2900 calories per day would need 725 fat calories each day  There are both healthy fats and unhealthy fats in foods  Ask your healthcare provider for more information about different types of fat and the total amount of fat you should have      · Carbohydrate  is the main source of energy your body uses during exercise  The amount you need depends on your daily calorie needs and the sport that you do  It also depends on whether you are male or female  Athletes need 6 to 10 grams of carbohydrates for each kilogram of body weight  To find your weight in kilograms, divide your weight in pounds by 2 2  Then multiply this number by your carbohydrate needs  For example, if you weigh 70 kilograms, you would need 420 to 700 grams of carbohydrate each day  · Protein  helps to build and repair muscle, produce hormones, boost your immune system, and replace blood cells  The amount of protein you need is only slightly higher than the amount suggested for people who do not exercise  Endurance athletes need 1 2 to 1 4 grams for each kilogram of body weight per day  Athletes who do strength training (such as lifting weights) need 1 2 to 1 7 grams for each kilogram of body weight per day  People can usually meet their needs for protein by following a balanced meal plan  Good sources of protein are lean meats, poultry, eggs, milk, cheese, peanut butter, and beans  Protein or amino acid supplements are not needed for weight gain if you follow a healthy and balanced meal plan  How to increase calories:  You need to eat or drink 500 to 1000 extra calories each day to gain about ½ to 1 pound each week  Your dietitian can tell you how many calories you need each day to gain weight  The following are some ways to add extra calories to your diet:  · Eat every 2 to 3 hours and 30 minutes after you exercise  · Include whole-grain carbohydrates and a lean protein food in each of your meals and snacks  Examples of whole-grain carbohydrates include whole-wheat bread, rolls, and bagels  Examples of lean protein include chicken and turkey  · Add high-calorie foods to your meals  Examples include cheese, peanut butter, avocado, nuts, and granola  · Carry healthy snacks with you   Examples of snacks that provide about 500 calories:    ? 8 saltine crackers, 1 ounce of cheese, and 1 cup of ice cream    ? 1 cup of dry cereal with 1 cup of whole milk, 1 banana, and 1 slice of toast with 1 tablespoon of peanut butter    ? 6 juani cracker squares with 2 tablespoons of peanut butter, 2 tablespoons of raisins, and 1 cup of orange juice    Healthy foods that are higher in calories:   · Choose whole-grain breads, such as honey bran, rye, and pumpernickel instead of white bread  Add peanut butter, margarine, jam, or honey for extra calories  · Eat high-calorie cereals, such as granola and cereals that contain nuts  These are healthy choices and have more calories per serving than puffed rice or corn flakes  The serving size of a cereal is listed on the food label  You can also add more calories to cereals by adding nuts, raisins, and other fruits  · Bananas, pineapple, mangos, raisins, dates, and dried fruit have more calories per serving than watery fruits  Some examples of watery fruits are watermelon, grapefruit, apples, and peaches  Trail mix is a good choice because it contains dried fruits and nuts  · Add margarine, almonds, and cheese to vegetables for extra calories  Stir-frying vegetables with canola or olive oil will also add extra calories  · Cook chicken or fish in a small amount of canola or olive oil  Red meats, such as beef, pork, and lamb, have more calories, but they also have more saturated fat  Saturated fat is an unhealthy type of fat because it may increase blood cholesterol  When you eat red meats, choose leaner cuts  Some examples of lean cuts of red meat are round or sirloin steak, ground round, fresh or boiled ham, or center loin chop  Liquids you should drink:   · You can add calories to your diet by drinking juice, milk, milkshakes, and instant breakfast drinks  Drink plenty of water to prevent dehydration  Dehydration can cause serious health problems   Athletes have higher liquid needs because they lose water through sweat  · Always carry water with you during long exercise sessions  You can wear a special bag or belt made to carry water on your back or around your waist  Drink sports drinks during exercise sessions that last longer than 1 hour  The best way to check if you are drinking enough liquids is to check the color of your urine  Urine should be clear or very light yellow, with little or no smell  If your urine is dark or smells strong, you may not be drinking enough  Follow up with your doctor as directed:  Write down your questions so you remember to ask them during your visits  © Copyright [address] 2022 Information is for End User's use only and may not be sold, redistributed or otherwise used for commercial purposes  All illustrations and images included in CareNotes® are the copyrighted property of [employer] M [employer]  or Derrick Serrano  The above information is an  only  It is not intended as medical advice for individual conditions or treatments  Talk to your doctor, nurse or pharmacist before following any medical regimen to see if it is safe and effective for you

## 2022-04-04 NOTE — TELEPHONE ENCOUNTER
Patient requesting a tetanus shot   She stated she heard she's suppose to get one every 10 years   She has not had one in over 12 years when she had her daughter

## 2022-04-04 NOTE — PROGRESS NOTES
[facility]    NAME: Carrol Kovacs  AGE: 46 y o  SEX: female  : 1970   DATE: 2022     Assessment and Plan:     Problem List Items Addressed This Visit        Other    Anaphylactic syndrome    Relevant Medications    EPINEPHrine (EPIPEN) 0 3 mg/0 3 mL SOAJ      Other Visit Diagnoses     Annual physical exam    -  Primary    Choking, initial encounter        Relevant Orders    FL barium swallow ROUTINE esophagus    Moderate protein-calorie malnutrition ([address] )            Immunizations and preventive care screenings were discussed with patient today  Appropriate education was printed on patient's after visit summary  Counseling:  Dental Health: discussed importance of regular tooth brushing, flossing, and dental visits  · Exercise: the importance of regular exercise/physical activity was discussed  Recommend exercise 3-5 times per week for at least 30 minutes  BMI Counseling: Body mass index is 17 71 kg/m²  The BMI is below normal  Patient advised to gain weight and dietary education for weight gain was provided  Rationale for BMI follow-up plan is due to patient being underweight  Depression Screening and Follow-up Plan: Patient was screened for depression during today's encounter  They screened negative with a PHQ-2 score of 0  BMI Counseling: Body mass index is 17 71 kg/m²  The BMI is below normal  Patient was advised to gain weight  Return in about 1 year (around 2023)  Chief Complaint:     Chief Complaint   Patient presents with    Physical Exam      History of Present Illness:     Adult Annual Physical   This is a very pleasant 46 y o  patient here for a comprehensive physical exam  She is still having the same issue of feeling food is stuck or choking with swallowing solids  Liquids are fine  Anxious regarding weight gain, and fluctuations in weight, bloating through the month   She also notes her wellness practitioner advised she should have 2 BM daily, but she only averages 2 weekly  Diet and Physical Activity  · Diet/Nutrition: well balanced diet, limited junk food, consuming 3-5 servings of fruits/vegetables daily, adequate fiber intake and adequate whole grain intake  · Exercise: moderate cardiovascular exercise, 5-7 times a week on average and 1-2 hours on average  Depression Screening  PHQ-2/9 Depression Screening    Little interest or pleasure in doing things: 0 - not at all  Feeling down, depressed, or hopeless: 0 - not at all  PHQ-2 Score: 0  PHQ-2 Interpretation: Negative depression screen       General Health  · Sleep: sleeps well  · Hearing: normal - bilateral   · Vision: no vision problems  · Dental: regular dental visits  /GYN Health  · Patient is: perimenopausal  · Last menstrual period: approx July 2021     Review of Systems:     Review of Systems   Constitutional: Negative for activity change, chills and fever  HENT: Positive for trouble swallowing  Negative for congestion, rhinorrhea and sore throat  Eyes: Negative for visual disturbance  Respiratory: Negative for cough, shortness of breath and wheezing  Cardiovascular: Negative for chest pain and palpitations  Gastrointestinal: Negative for abdominal pain, blood in stool, constipation, diarrhea, nausea and vomiting  Genitourinary: Negative for dysuria  Musculoskeletal: Negative for arthralgias and myalgias  Skin: Negative for rash  Neurological: Negative for dizziness, weakness and headaches  Psychiatric/Behavioral: Negative for dysphoric mood  All other systems reviewed and are negative       Past Medical History:     Past Medical History:   Diagnosis Date    Allergic     Disease of thyroid gland       Past Surgical History:     Past Surgical History:   Procedure Laterality Date    BREAST BIOPSY Left     2yrs ago    RHINOPLASTY      SINUS SURGERY      US GUIDED BREAST BIOPSY LEFT COMPLETE Left 5/1/2019    WISDOM TOOTH EXTRACTION        Social History:     Social History     Socioeconomic History    Marital status: /Civil Union     Spouse name: Nick    Number of children: 2    Years of education: None    Highest education level: None   Occupational History    None   Tobacco Use    Smoking status: Never Smoker    Smokeless tobacco: Never Used   Vaping Use    Vaping Use: Never used   Substance and Sexual Activity    Alcohol use: Never    Drug use: Never    Sexual activity: Yes     Partners: Male     Birth control/protection: None   Other Topics Concern    None   Social History Narrative    None     Social Determinants of Health     Financial Resource Strain: Not on file   Food Insecurity: Not on file   Transportation Needs: Not on file   Physical Activity: Not on file   Stress: Not on file   Social Connections: Not on file   Intimate Partner Violence: Not on file   Housing Stability: Not on file      Family History:     Family History   Problem Relation Age of Onset    Diabetes Mother     Thyroid disease Mother     Dementia Father     Heart disease Father     Heart failure Father     Thyroid disease Father     Breast cancer Sister     Stroke Maternal Grandmother     COPD Maternal Grandfather     Dementia Paternal Grandmother     Alzheimer's disease Paternal Grandmother     No Known Problems Daughter     No Known Problems Maternal Aunt     Colon cancer Neg Hx     Mental illness Neg Hx     Substance Abuse Neg Hx     Depression Neg Hx       Current Medications:     Current Outpatient Medications   Medication Sig Dispense Refill    Acetylcysteine (NAC PO) Take 1 tablet by mouth daily at bedtime      Ascorbic Acid (VITAMIN C PO) Take by mouth Take 1 in the morning and 2 with dinner      B Complex Vitamins (B COMPLEX PO) Take by mouth 2 pumps daily      BIOTIN PO Take 1 tablet by mouth daily      BLACK CURRANT SEED OIL PO Take 1 tablet by mouth 2 (two) times a day      Cholecalciferol (VITAMIN D3 PO) Take by mouth Take 4 drops daily      COLLAGEN PO Take 2 Scoops by mouth daily      LUTEIN PO Take 1 tablet by mouth daily      LYSINE ACETATE PO Take 2 tablets by mouth 2 (two) times a day      Multiple Vitamins-Iron (CHLORELLA PO) Take 3 tablets by mouth daily at bedtime      Probiotic Product (PROBIOTIC DAILY PO) Take 1 tablet by mouth daily at bedtime      QUERCETIN PO Take 1 tablet by mouth 2 (two) times a day      zinc gluconate 50 mg tablet Take 50 mg by mouth 2 (two) times a day      EPINEPHrine (EPIPEN) 0 3 mg/0 3 mL SOAJ Inject 0 3 mL (0 3 mg total) into a muscle once for 1 dose 0 6 mL 0     No current facility-administered medications for this visit  Allergies: Allergies   Allergen Reactions    Cinnamon - Food Allergy Anaphylaxis    Coconut Fatty Acids - Food Allergy Anaphylaxis    Fish Allergy - Food Allergy Anaphylaxis      Physical Exam:     /64   Pulse 68   Temp 98 3 °F (36 8 °C)   Ht 5' 3" (1 6 m)   Wt 45 4 kg (100 lb)   BMI 17 71 kg/m²   BMI Counseling: Body mass index is 17 71 kg/m²  The BMI is below normal  Patient was advised to gain weight  Physical Exam  Vitals and nursing note reviewed  Constitutional:       General: She is not in acute distress  Appearance: She is well-developed and underweight  She is not ill-appearing  HENT:      Head: Normocephalic and atraumatic  Right Ear: Tympanic membrane, ear canal and external ear normal  No middle ear effusion  Left Ear: Tympanic membrane, ear canal and external ear normal   No middle ear effusion  Nose: Nose normal  No congestion or rhinorrhea  Mouth/Throat:      Lips: Pink  Mouth: Mucous membranes are moist       Pharynx: Oropharynx is clear  Uvula midline  No oropharyngeal exudate  Tonsils: No tonsillar exudate  Eyes:      General: Lids are normal       Extraocular Movements: Extraocular movements intact  Conjunctiva/sclera: Conjunctivae normal       Pupils: Pupils are equal, round, and reactive to light  Neck:      Thyroid: No thyromegaly  Trachea: No tracheal deviation  Cardiovascular:      Rate and Rhythm: Normal rate and regular rhythm  Pulses: Normal pulses  Heart sounds: Normal heart sounds, S1 normal and S2 normal  No murmur heard  Pulmonary:      Effort: Pulmonary effort is normal  No respiratory distress  Breath sounds: Normal breath sounds  No decreased breath sounds, wheezing, rhonchi or rales  Abdominal:      General: Bowel sounds are normal  There is no distension  Palpations: Abdomen is soft  Tenderness: There is no abdominal tenderness  Musculoskeletal:      Right lower leg: No edema  Left lower leg: No edema  Lymphadenopathy:      Cervical: No cervical adenopathy  Skin:     General: Skin is warm and dry  Capillary Refill: Capillary refill takes less than 2 seconds  Neurological:      Mental Status: She is alert and oriented to person, place, and time  Cranial Nerves: Cranial nerves are intact  Deep Tendon Reflexes: Reflexes normal       Reflex Scores:       Patellar reflexes are 2+ on the right side and 2+ on the left side  Psychiatric:         Attention and Perception: Attention normal          Mood and Affect: Mood is anxious  Thought Content: Thought content does not include suicidal ideation         MD Raul ManBanner Behavior[facility] Health Hospital

## 2022-04-12 ENCOUNTER — PATIENT MESSAGE (OUTPATIENT)
Dept: FAMILY MEDICINE CLINIC | Facility: CLINIC | Age: 52
End: 2022-04-12

## 2022-04-12 DIAGNOSIS — J01.10 ACUTE NON-RECURRENT FRONTAL SINUSITIS: Primary | ICD-10-CM

## 2022-04-12 DIAGNOSIS — J34.89 SINUS PRESSURE: Primary | ICD-10-CM

## 2022-04-12 RX ORDER — AMOXICILLIN AND CLAVULANATE POTASSIUM 875; 125 MG/1; MG/1
1 TABLET, FILM COATED ORAL EVERY 12 HOURS SCHEDULED
Qty: 14 TABLET | Refills: 0 | Status: SHIPPED | OUTPATIENT
Start: 2022-04-12 | End: 2022-04-20 | Stop reason: ALTCHOICE

## 2022-04-13 DIAGNOSIS — J01.10 ACUTE NON-RECURRENT FRONTAL SINUSITIS: Primary | ICD-10-CM

## 2022-04-13 RX ORDER — CEFUROXIME AXETIL 500 MG/1
500 TABLET ORAL EVERY 12 HOURS SCHEDULED
Qty: 14 TABLET | Refills: 0 | Status: SHIPPED | OUTPATIENT
Start: 2022-04-13 | End: 2022-04-20

## 2022-04-20 ENCOUNTER — OFFICE VISIT (OUTPATIENT)
Dept: FAMILY MEDICINE CLINIC | Facility: CLINIC | Age: 52
End: 2022-04-20
Payer: COMMERCIAL

## 2022-04-20 VITALS
SYSTOLIC BLOOD PRESSURE: 108 MMHG | WEIGHT: 99 LBS | TEMPERATURE: 95.5 F | HEART RATE: 59 BPM | HEIGHT: 63 IN | OXYGEN SATURATION: 97 % | BODY MASS INDEX: 17.54 KG/M2 | DIASTOLIC BLOOD PRESSURE: 70 MMHG

## 2022-04-20 DIAGNOSIS — Z23 IMMUNIZATION DUE: ICD-10-CM

## 2022-04-20 DIAGNOSIS — S09.90XA INJURY OF HEAD, INITIAL ENCOUNTER: Primary | ICD-10-CM

## 2022-04-20 PROCEDURE — 3008F BODY MASS INDEX DOCD: CPT | Performed by: FAMILY MEDICINE

## 2022-04-20 PROCEDURE — 1036F TOBACCO NON-USER: CPT | Performed by: FAMILY MEDICINE

## 2022-04-20 PROCEDURE — 99213 OFFICE O/P EST LOW 20 MIN: CPT | Performed by: FAMILY MEDICINE

## 2022-04-20 PROCEDURE — 90715 TDAP VACCINE 7 YRS/> IM: CPT

## 2022-04-20 PROCEDURE — 90471 IMMUNIZATION ADMIN: CPT

## 2022-04-20 NOTE — PROGRESS NOTES
Assessment/Plan:      Diagnoses and all orders for this visit:    Injury of head, initial encounter    Immunization due  -     TDAP VACCINE GREATER THAN OR EQUAL TO 6YO IM    Normal neurologic exam  Counseled regarding expected course of healing  Recommend TDAP booster now  Counseled the patient regarding supportive care  Subjective:     Patient ID: Kerline Morrow is a 45 y o  female  Injury  Incident onset: Friday 4/15  [place] ship  The injury mechanism was a direct blow (hit her head on overhead structure inside ship  no visible rust )  No protective equipment was used  There is an injury to the head  The pain is moderate  It is unlikely that a foreign body is present  Associated symptoms include tingling  Pertinent negatives include no abnormal behavior, difficulty breathing, headaches (except pain at site of injury), hearing loss, light-headedness, loss of consciousness, memory loss, nausea, neck pain, numbness, visual disturbance, vomiting or weakness  (Initially black vision for a second, then no subsequent issues  Head pain at site of injury, along with intermittent tingling  Bleeding stopped with pressure and ice) There have been no prior injuries to these areas  Her tetanus status is out of date  Review of Systems   HENT: Negative for hearing loss  Eyes: Negative for visual disturbance  Gastrointestinal: Negative for nausea and vomiting  Musculoskeletal: Negative for neck pain  Neurological: Positive for tingling  Negative for dizziness, tremors, loss of consciousness, syncope, facial asymmetry, speech difficulty, weakness, light-headedness, numbness and headaches (except pain at site of injury)  Psychiatric/Behavioral: Negative for memory loss  All other systems reviewed and are negative  Objective:     Physical Exam  Vitals and nursing note reviewed  Constitutional:       General: She is not in acute distress  Appearance: Normal appearance   She is well-developed  She is not ill-appearing or diaphoretic  HENT:      Head: Normocephalic  Laceration present  Comments: Scabbed over laceration without surrounding erythema or induration  TTP  No palpable step off or hematoma  Right Ear: External ear normal       Left Ear: External ear normal       Nose: Nose normal    Eyes:      General: Lids are normal  No visual field deficit  Extraocular Movements:      Right eye: No nystagmus  Left eye: No nystagmus  Conjunctiva/sclera: Conjunctivae normal       Pupils: Pupils are equal, round, and reactive to light  Neck:      Vascular: No JVD  Trachea: No tracheal deviation  Pulmonary:      Effort: No accessory muscle usage or respiratory distress  Skin:     Findings: No rash  Neurological:      General: No focal deficit present  Mental Status: She is alert and oriented to person, place, and time  GCS: GCS eye subscore is 4  GCS verbal subscore is 5  GCS motor subscore is 6  Cranial Nerves: Cranial nerves are intact  No cranial nerve deficit, dysarthria or facial asymmetry  Sensory: No sensory deficit  Motor: Motor function is intact  No weakness, tremor, abnormal muscle tone or pronator drift  Coordination: Coordination is intact  Romberg sign negative  Coordination normal  Finger-Nose-Finger Test and Heel to RUST Test normal       Gait: Gait normal       Deep Tendon Reflexes: Reflexes are normal and symmetric

## 2022-05-02 ENCOUNTER — OFFICE VISIT (OUTPATIENT)
Dept: FAMILY MEDICINE CLINIC | Facility: CLINIC | Age: 52
End: 2022-05-02
Payer: COMMERCIAL

## 2022-05-02 VITALS
TEMPERATURE: 96.3 F | OXYGEN SATURATION: 100 % | DIASTOLIC BLOOD PRESSURE: 74 MMHG | SYSTOLIC BLOOD PRESSURE: 104 MMHG | WEIGHT: 99.5 LBS | HEIGHT: 63 IN | BODY MASS INDEX: 17.63 KG/M2 | HEART RATE: 64 BPM

## 2022-05-02 DIAGNOSIS — N89.8 VAGINAL ITCHING: Primary | ICD-10-CM

## 2022-05-02 PROCEDURE — 87086 URINE CULTURE/COLONY COUNT: CPT | Performed by: FAMILY MEDICINE

## 2022-05-02 PROCEDURE — 87510 GARDNER VAG DNA DIR PROBE: CPT | Performed by: FAMILY MEDICINE

## 2022-05-02 PROCEDURE — 1036F TOBACCO NON-USER: CPT | Performed by: FAMILY MEDICINE

## 2022-05-02 PROCEDURE — 87660 TRICHOMONAS VAGIN DIR PROBE: CPT | Performed by: FAMILY MEDICINE

## 2022-05-02 PROCEDURE — 99213 OFFICE O/P EST LOW 20 MIN: CPT | Performed by: FAMILY MEDICINE

## 2022-05-02 PROCEDURE — 3008F BODY MASS INDEX DOCD: CPT | Performed by: FAMILY MEDICINE

## 2022-05-02 PROCEDURE — 87480 CANDIDA DNA DIR PROBE: CPT | Performed by: FAMILY MEDICINE

## 2022-05-02 NOTE — PROGRESS NOTES
Assessment/Plan:      Diagnoses and all orders for this visit:    Vaginal itching  -     VAGINOSIS DNA PROBE (AFFIRM); Future  -     Urine culture; Future    Counseled the patient regarding supportive care  They are to call or return to the office if not improving  Subjective:     Patient ID: Melvin Peña is a 45 y o  female  HPI   Urinary frequency, discomfort, sensation of incomplete emptying  She also had a few days of greenish yellow discharge and discomfort  She feels "poofy" down there  She denies fever, nausea, abd pain  Review of Systems   Constitutional: Negative for chills and fever  HENT: Negative for congestion  Respiratory: Negative for cough  Gastrointestinal: Negative for nausea  Genitourinary: Positive for dysuria, frequency, vaginal discharge and vaginal pain  Negative for flank pain  All other systems reviewed and are negative  Objective:     Physical Exam  Vitals and nursing note reviewed  Exam conducted with a chaperone present Fred Calle)  Constitutional:       General: She is not in acute distress  Appearance: Normal appearance  She is well-developed  She is not ill-appearing or diaphoretic  HENT:      Head: Normocephalic and atraumatic  Right Ear: External ear normal       Left Ear: External ear normal       Nose: Nose normal    Eyes:      General: Lids are normal       Conjunctiva/sclera: Conjunctivae normal    Neck:      Vascular: No JVD  Trachea: No tracheal deviation  Pulmonary:      Effort: No accessory muscle usage or respiratory distress  Genitourinary:     Pubic Area: No rash  Labia:         Right: No rash or lesion  Left: No rash or lesion  Comments: White, think discharge  Skin:     Findings: No rash  Neurological:      Mental Status: She is alert

## 2022-05-03 ENCOUNTER — PATIENT MESSAGE (OUTPATIENT)
Dept: FAMILY MEDICINE CLINIC | Facility: CLINIC | Age: 52
End: 2022-05-03

## 2022-05-03 DIAGNOSIS — J34.89 SINUS PRESSURE: ICD-10-CM

## 2022-05-03 LAB — BACTERIA UR CULT: NORMAL

## 2022-05-03 RX ORDER — AMOXICILLIN AND CLAVULANATE POTASSIUM 875; 125 MG/1; MG/1
1 TABLET, FILM COATED ORAL EVERY 12 HOURS SCHEDULED
Qty: 14 TABLET | Refills: 0 | Status: SHIPPED | OUTPATIENT
Start: 2022-05-03 | End: 2022-05-03 | Stop reason: SDUPTHER

## 2022-05-03 RX ORDER — AMOXICILLIN AND CLAVULANATE POTASSIUM 875; 125 MG/1; MG/1
1 TABLET, FILM COATED ORAL EVERY 12 HOURS SCHEDULED
Qty: 14 TABLET | Refills: 0 | Status: SHIPPED | OUTPATIENT
Start: 2022-05-03 | End: 2022-05-10

## 2022-05-04 LAB
CANDIDA RRNA VAG QL PROBE: NEGATIVE
G VAGINALIS RRNA GENITAL QL PROBE: NEGATIVE
T VAGINALIS RRNA GENITAL QL PROBE: NEGATIVE

## 2022-12-23 ENCOUNTER — TELEPHONE (OUTPATIENT)
Dept: FAMILY MEDICINE CLINIC | Facility: CLINIC | Age: 52
End: 2022-12-23

## 2022-12-23 NOTE — TELEPHONE ENCOUNTER
----- Message from Deja Cotto sent at 12/23/2022  8:12 AM EST -----  Regarding: FW: John Sandhu  Contact: [phone]    ----- Message -----  From: Ame Lynn  Sent: 12/22/2022   6:43 PM EST  To: Keaton Sanders [facility]  Subject: John Sandhu                                    And the fact if there was ever an emergency and I needed to provide immunizations, etc  I have no access to that    This is really upsetting

## 2022-12-23 NOTE — TELEPHONE ENCOUNTER
Spoke to patient about her daughter's Negra  Explained the [facility] protocol/ reasoning behind why minors have access to their charts, and made sure that she had proxy to access her daughter's chart  She apologizes if she came off "snippy", but wanted to make sure that in case of emergency that she was able to access daughter's information

## 2023-03-15 ENCOUNTER — TELEPHONE (OUTPATIENT)
Dept: FAMILY MEDICINE CLINIC | Facility: CLINIC | Age: 53
End: 2023-03-15

## 2023-03-15 DIAGNOSIS — T78.2XXA ANAPHYLAXIS, INITIAL ENCOUNTER: ICD-10-CM

## 2023-03-15 RX ORDER — EPINEPHRINE 0.3 MG/.3ML
0.3 INJECTION SUBCUTANEOUS ONCE
Qty: 0.6 ML | Refills: 0 | Status: SHIPPED | OUTPATIENT
Start: 2023-03-15 | End: 2023-03-15

## 2023-03-15 NOTE — TELEPHONE ENCOUNTER
Patient is requesting that you send a script for a new Epi pen to the [facility] on [address]    Her current one will be expiring shortly

## 2023-03-28 ENCOUNTER — OFFICE VISIT (OUTPATIENT)
Dept: FAMILY MEDICINE CLINIC | Facility: CLINIC | Age: 53
End: 2023-03-28

## 2023-03-28 VITALS
HEIGHT: 63 IN | HEART RATE: 67 BPM | OXYGEN SATURATION: 98 % | BODY MASS INDEX: 17.54 KG/M2 | DIASTOLIC BLOOD PRESSURE: 72 MMHG | TEMPERATURE: 97.4 F | WEIGHT: 99 LBS | SYSTOLIC BLOOD PRESSURE: 100 MMHG

## 2023-03-28 DIAGNOSIS — J01.10 ACUTE NON-RECURRENT FRONTAL SINUSITIS: Primary | ICD-10-CM

## 2023-03-28 RX ORDER — AMOXICILLIN AND CLAVULANATE POTASSIUM 875; 125 MG/1; MG/1
1 TABLET, FILM COATED ORAL EVERY 12 HOURS SCHEDULED
Qty: 14 TABLET | Refills: 0 | Status: SHIPPED | OUTPATIENT
Start: 2023-03-28 | End: 2023-04-04

## 2023-03-28 NOTE — PROGRESS NOTES
Name: Bladimir Beyer      : 1970      MRN: [id number]  Encounter Provider: Daylin Kumar MD  Encounter Date: 3/28/2023   Encounter department: [address] [place] [address]     1  Acute non-recurrent frontal sinusitis  -     amoxicillin-clavulanate (AUGMENTIN) 875-125 mg per tablet; Take 1 tablet by mouth every 12 (twelve) hours for 7 days  Counseled the patient regarding supportive care  They are to call or return to the office if not improving  Subjective      Started last night with headache, chills, sinus pressure, body aches, fatigued  Tylenol  Woke up drenched in sweat  Fatigue  Today ear pressure, sore neck, sinus pressure, body aches  Review of Systems   Constitutional: Positive for activity change, appetite change, chills, diaphoresis and fatigue  HENT: Positive for congestion, ear pain, postnasal drip, sinus pressure, sinus pain and sore throat  Musculoskeletal: Positive for myalgias  Neurological: Positive for headaches  All other systems reviewed and are negative        Current Outpatient Medications on File Prior to Visit   Medication Sig   • Ascorbic Acid (VITAMIN C PO) Take by mouth Take 1 in the morning and 2 with dinner   • B Complex Vitamins (B COMPLEX PO) Take by mouth 2 pumps daily   • BIOTIN PO Take 1 tablet by mouth daily   • BLACK CURRANT SEED OIL PO Take 1 tablet by mouth 2 (two) times a day   • Cholecalciferol (VITAMIN D3 PO) Take by mouth Take 4 drops daily   • COLLAGEN PO Take 2 Scoops by mouth daily   • LUTEIN PO Take 1 tablet by mouth daily   • LYSINE ACETATE PO Take 2 tablets by mouth 2 (two) times a day   • Multiple Vitamins-Iron (CHLORELLA PO) Take 3 tablets by mouth daily at bedtime   • Probiotic Product (PROBIOTIC DAILY PO) Take 1 tablet by mouth daily at bedtime   • QUERCETIN PO Take 1 tablet by mouth 2 (two) times a day   • zinc gluconate 50 mg tablet Take 50 mg by mouth 2 (two) times a day   • EPINEPHrine "(EPIPEN) 0 3 mg/0 3 mL SOAJ Inject 0 3 mL (0 3 mg total) into a muscle once for 1 dose (Patient not taking: Reported on 3/28/2023)   • [DISCONTINUED] Acetylcysteine (NAC PO) Take 1 tablet by mouth daily at bedtime (Patient not taking: Reported on 3/28/2023)       Objective     /72   Pulse 67   Temp (!) 97 4 °F (36 3 °C)   Ht 5' 3\" (1 6 m)   Wt 44 9 kg (99 lb)   LMP 07/20/2021   SpO2 98%   BMI 17 54 kg/m²     Physical Exam  Vitals and nursing note reviewed  Constitutional:       General: She is not in acute distress  Appearance: Normal appearance  She is well-developed  She is ill-appearing  She is not diaphoretic  HENT:      Head: Normocephalic and atraumatic  Right Ear: Tympanic membrane and external ear normal       Left Ear: Tympanic membrane and external ear normal       Nose: Congestion present  Right Sinus: Maxillary sinus tenderness present  Left Sinus: Maxillary sinus tenderness present  Eyes:      General: Lids are normal       Conjunctiva/sclera: Conjunctivae normal    Neck:      Vascular: No JVD  Trachea: No tracheal deviation  Cardiovascular:      Rate and Rhythm: Normal rate and regular rhythm  Heart sounds: Normal heart sounds  Pulmonary:      Effort: Pulmonary effort is normal  No accessory muscle usage or respiratory distress  Breath sounds: Normal breath sounds  No rhonchi or rales  Lymphadenopathy:      Cervical: Cervical adenopathy present  Skin:     Findings: No rash  Neurological:      Mental Status: She is alert         Alysa Olivarez MD  "

## 2023-04-24 ENCOUNTER — TELEMEDICINE (OUTPATIENT)
Dept: FAMILY MEDICINE CLINIC | Facility: CLINIC | Age: 53
End: 2023-04-24

## 2023-04-24 DIAGNOSIS — L55.0 SUNBURN OF FIRST DEGREE: Primary | ICD-10-CM

## 2023-04-24 NOTE — PROGRESS NOTES
Virtual Regular Visit    Verification of patient location:    Patient is located at Home in the following state in which I hold an active license [place]      Assessment/Plan:    Problem List Items Addressed This Visit    None  Visit Diagnoses     Sunburn of first degree    -  Primary        Discussed with patient plan to treat with conservative care: continue use of Aloe gel to cool the skin, NSAIDs for inflammation control and ice application as needed  Patient instructed to call if no improvement in 72 hours or symptoms worsen     Reason for visit is   Chief Complaint   Patient presents with   • Virtual Regular Visit          Encounter provider Brittany Casillas [facility]    Provider located at [address] [place] [address] [address] [facility] [address]      Recent Visits  No visits were found meeting these conditions  Showing recent visits within past 7 days and meeting all other requirements  Today's Visits  Date Type Provider Dept   04/24/23 Telemedicine Ange Beltran 429 today's visits and meeting all other requirements  Future Appointments  No visits were found meeting these conditions  Showing future appointments within next 150 days and meeting all other requirements       The patient was identified by name and date of birth  Susanne Arellano was informed that this is a telemedicine visit and that the visit is being conducted through the [facility]  She agrees to proceed     My office door was closed  No one else was in the room  She acknowledged consent and understanding of privacy and security of the video platform  The patient has agreed to participate and understands they can discontinue the visit at any time  Patient is aware this is a billable service  Kolby Navarro is a 52 y o  female     52 y  o female presenting with sunburn on her nose since this weekend   She reports that she went to a Boy  event up at [facility] [address] [facility] the weekend with her grandson and forgot to pack sunscreen  When she returned home she noted that her nose was leaking clear fluid and was swollen  She used neosporin, aloe, Vitamin E, Turmeric, and Advil to treat  She is concerned that she can get skin cancer now, that the sunburn is infected or she caused a severe sunburn or sun poisoning to occur  She did notice small amount of orange drainage after using the neosporin so stopped using  Her main symptoms at present time is the increased redness, dehydration and swelling          Past Medical History:   Diagnosis Date   • Allergic    • Disease of thyroid gland        Past Surgical History:   Procedure Laterality Date   • BREAST BIOPSY Left     2yrs ago   • RHINOPLASTY     • SINUS SURGERY     • US GUIDED BREAST BIOPSY LEFT COMPLETE Left 5/1/2019   • WISDOM TOOTH EXTRACTION         Current Outpatient Medications   Medication Sig Dispense Refill   • Ascorbic Acid (VITAMIN C PO) Take by mouth Take 1 in the morning and 2 with dinner     • B Complex Vitamins (B COMPLEX PO) Take by mouth 2 pumps daily     • BIOTIN PO Take 1 tablet by mouth daily     • BLACK CURRANT SEED OIL PO Take 1 tablet by mouth 2 (two) times a day     • Cholecalciferol (VITAMIN D3 PO) Take by mouth Take 4 drops daily     • COLLAGEN PO Take 2 Scoops by mouth daily     • EPINEPHrine (EPIPEN) 0 3 mg/0 3 mL SOAJ Inject 0 3 mL (0 3 mg total) into a muscle once for 1 dose (Patient not taking: Reported on 3/28/2023) 0 6 mL 0   • LUTEIN PO Take 1 tablet by mouth daily     • LYSINE ACETATE PO Take 2 tablets by mouth 2 (two) times a day     • Multiple Vitamins-Iron (CHLORELLA PO) Take 3 tablets by mouth daily at bedtime     • Probiotic Product (PROBIOTIC DAILY PO) Take 1 tablet by mouth daily at bedtime     • QUERCETIN PO Take 1 tablet by mouth 2 (two) times a day     • zinc gluconate 50 mg tablet Take 50 mg by mouth 2 (two) times a day       No current facility-administered medications for this visit  Allergies   Allergen Reactions   • Cinnamon - Food Allergy Anaphylaxis   • Coconut Fatty Acids - Food Allergy Anaphylaxis   • Fish Allergy - Food Allergy Anaphylaxis       Review of Systems   Constitutional: Negative for chills, fatigue and fever  HENT: Negative  Respiratory: Negative  Cardiovascular: Negative  Gastrointestinal: Negative  Skin:        Sunburn on nose with slight swelling   Neurological: Negative  Psychiatric/Behavioral: Negative  Video Exam    There were no vitals filed for this visit  (Vital signs not performed during visit due to limitations of telemedicine format along with patient's availability to needed equipment)    Physical Exam  Constitutional:       General: She is not in acute distress  Appearance: She is not ill-appearing  HENT:      Head: Normocephalic and atraumatic  Eyes:      Conjunctiva/sclera: Conjunctivae normal       Pupils: Pupils are equal, round, and reactive to light  Cardiovascular:      Rate and Rhythm: Normal rate and regular rhythm  Pulmonary:      Effort: No respiratory distress  Breath sounds: No wheezing  Skin:     Findings: Erythema present  Neurological:      General: No focal deficit present  Mental Status: She is alert and oriented to person, place, and time     Psychiatric:         Mood and Affect: Mood normal          Behavior: Behavior normal         Visit Time  Total Visit Duration: 15

## 2023-05-07 ENCOUNTER — OFFICE VISIT (OUTPATIENT)
Dept: URGENT CARE | Age: 53
End: 2023-05-07

## 2023-05-07 VITALS
HEART RATE: 82 BPM | RESPIRATION RATE: 18 BRPM | OXYGEN SATURATION: 99 % | SYSTOLIC BLOOD PRESSURE: 100 MMHG | TEMPERATURE: 98.6 F | DIASTOLIC BLOOD PRESSURE: 46 MMHG

## 2023-05-07 DIAGNOSIS — B34.9 VIRAL SYNDROME: Primary | ICD-10-CM

## 2023-05-07 DIAGNOSIS — U07.1 COVID: ICD-10-CM

## 2023-05-07 LAB
SARS-COV-2 AG UPPER RESP QL IA: POSITIVE
VALID CONTROL: ABNORMAL

## 2023-05-07 RX ORDER — NIRMATRELVIR AND RITONAVIR 300-100 MG
3 KIT ORAL 2 TIMES DAILY
Qty: 30 TABLET | Refills: 0 | Status: SHIPPED | OUTPATIENT
Start: 2023-05-07 | End: 2023-05-12

## 2023-05-07 NOTE — PROGRESS NOTES
[facility]        NAME: Jerrell Shultz is a 52 y o  female  : 1970    MRN: [id number]  DATE: May 7, 2023  TIME: 10:13 AM    Assessment and Plan   Viral syndrome [B34 9]  1  Viral syndrome  Poct Covid 19 Rapid Antigen Test      2  COVID  nirmatrelvir & ritonavir (Paxlovid, 300/100,) tablet therapy pack      Please begin Paxlovid as directed  May alternate Tylenol and Ibuprofen as needed, OTC decongestants are also acceptable  Patient Instructions   COVID-19 2-dose Vaccines   AMBULATORY CARE:   A COVID-19 2-dose vaccine  is given to help prevent severe illness and hospitalization  The vaccines do not contain the virus that causes COVID-19  This means you are not at risk for getting COVID-19 from a vaccine  Instead, the vaccines teach your immune system to recognize the virus and produce antibodies to fight it  Healthcare providers recommend a COVID-19 vaccine, even if you have already had COVID-19  How the vaccine is given:  The vaccine is given as a shot in your shoulder area  Two doses are needed for full protection  The time between doses varies, depending on the vaccine you receive  Make an appointment to get the second dose at the time recommended by your healthcare provider         What you need to know about COVID-19 vaccine approvals and emergency use authorizations (EUAs): An EUA means the vaccine has not been fully approved but is given because the benefits outweigh the risks  A vaccine may be fully approved for certain ages, such as 12 years or older  The same vaccine may also have an EUA for anyone younger than 16 years  Ask your healthcare provider if you have questions about the different vaccines  What you need to know about booster shots:  A booster shot is an additional dose that helps your immune system continue to protect against severe COVID-19  A booster is recommended for everyone 5 years or older   A second booster is recommended for adults 48 or older and anyone 12 years or older who is immunocompromised  Your healthcare provider can help you schedule boosters  Timing depends on the age of the person getting vaccinated and if he or she is immunocompromised  Reasons you should wait to get the vaccine:   • You are sick or have a fever      • You took acetaminophen or ibuprofen the day you are to get the shot      • You had COVID-19 and received monoclonal antibodies or convalescent plasma  Wait at least 90 days after the end of treatment to get the vaccine      Before you get the vaccine, tell your healthcare provider if:   • You have thrombocytopenia, a blood clotting disorder, or are taking blood thinning medicines      • Your immune system is weakened from medicines such as chemotherapy or steroids      • You know or think you are pregnant  Talk to your obstetrician or doctor about the benefits and risks of the vaccine during pregnancy      • You are breastfeeding      • You have an allergy to any component (part) of the vaccine      • You have a history of allergies      After you get the vaccine: You will be considered fully vaccinated 2 weeks after you get the final dose   This is how long your immune system needs to build a response strong enough to protect you  The following are guidelines to follow after you are fully vaccinated :  • Continue being careful until experts are sure a fully vaccinated person cannot get infected or pass the virus to others  Wear a face covering and stay 6 feet away from others when you are in public  Remember to wash your hands often      • It is considered safe to gather indoors without face coverings or social distancing if everyone there is fully vaccinated      • If you are exposed to the virus, you do not need to isolate or get tested unless you develop symptoms      • Check to see if you need to be tested before you travel  You may also need to quarantine after you return  Some countries require proof of a negative test before you leave  You should also be tested 3 to 5 days after you return from another country      • You may need to wear a face covering (mask) while you travel by plane, bus, or train      Risks of the vaccine: This is a new vaccine  All side effects may not be known  • You may have an allergic reaction to the vaccine      • You may not be able to get a second dose if you had a severe allergic reaction to the first dose  It may be life-threatening      • The vaccine may cause mild symptoms, such as a fever, chills, headache, and muscle aches      • You may develop swelling in and around your heart  Seek help immediately if you have chest pain, shortness of breath, or a fast, fluttering, or pounding heartbeat       • The area where you got the shot may be swollen, sore, or tender  This is usually mild and goes away in a few hours  It may help to move your arm around      • The vaccine may not be as helpful if your immune system is weak      • You may still get infected with the coronavirus after you receive the vaccine      What else you need to know about the vaccine:   • It is possible to become infected even after you get the vaccine  The vaccine helps lower the risk, but no vaccine can completely prevent infection  You may be able to pass the virus to others without knowing you are infected  Continue following social distancing guidelines in your area      • The vaccine does not cause reproductive problems  Your healthcare provider can give you more information about the safety of vaccines before or during pregnancy  The vaccine is not believed to cause fertility problems      • Your DNA will not be changed  The vaccine does not affect the part of cells that control DNA      • Your child may need to be fully vaccinated to attend school  Ask your child's school as soon as possible  Vaccine requirements may vary by area  • It is possible to become infected even after you get the vaccine    The vaccine helps lower the risk, but no vaccine can completely prevent infection  You may be able to pass the virus to others without knowing you are infected  Continue following social distancing guidelines in your area      • The vaccine does not cause reproductive problems  Your healthcare provider can give you more information about the safety of vaccines before or during pregnancy  The vaccine is not believed to cause fertility problems      • Your DNA will not be changed  The vaccine does not affect the part of cells that control DNA      • Your child may need to be fully vaccinated to attend school  Ask your child's school as soon as possible  Vaccine requirements may vary by area      Call your local emergency number (911 in the [address] [place] [address] [address]) if:   • You have signs of a severe allergic reaction, such as trouble breathing, hives, or wheezing      • Your mouth and throat are swollen      • You have chest pain or your heart is beating faster than normal for you      • Your face is red or swollen      • You have hives that spread over your body      Call your doctor or the place where you got the vaccine if:   • You have a fever      • You have any other signs or symptoms that concern you or do not go away      • You have increased pain, redness, or swelling around the area where the shot was given      • You have questions or concerns about the COVID-19 vaccine      Apply a warm compress  to the area where the shot was given to decrease pain and swelling  It may also help to move your arm around  Other ways to prevent coronavirus infection:  Droplets are the most common way all coronaviruses spread  The virus spreads from person to person through talking, singing, coughing, and sneezing  Continue to do the following to keep yourself and others safe, even if you have had the vaccine:  • Wash your hands often throughout the day  Use hand  that contains alcohol if soap and water are not available   Do not touch your eyes, nose, or mouth without washing your hands first  Teach children how to wash their hands and use hand            • A face covering  is recommended when you are in public, such as grocery stores, pharmacies, and on mass transit  You may live in an area that has a face covering mandate  This means you must wear a covering in public  Check the laws in your area before you leave your home  Bring extra coverings with you             Follow up with your doctor as directed:  Write down your questions so you remember to ask them during your visits  © Copyright [employer] 2022 Information is for End User's use only and may not be sold, redistributed or otherwise used for commercial purposes  The above information is an  only  It is not intended as medical advice for individual conditions or treatments  Talk to your doctor, nurse or pharmacist before following any medical regimen to see if it is safe and effective for you  Follow up with PCP in 3-5 days  Proceed to  ER if symptoms worsen  Chief Complaint     Chief Complaint   Patient presents with   • Generalized Body Aches   • Fever   • Headache   • Cold Like Symptoms   • Fatigue   • Sore Throat   • Chills     Patient was exposed to COVID(  was diagnosis on Friday) her symptoms started yesterday         History of Present Illness       Patient is a 58-year-old female with no significant past medical history who presents with daughter for evaluation of body aches, headache, cough, congestion, sore throat since yesterday  She reports that her  tested positive for COVID on Friday  Her 2 children are having similar symptoms  She denies fever, chest pain, palpitations, wheezing, nausea/vomiting/diarrhea  Review of Systems   Review of Systems   Constitutional: Negative for fatigue and fever  HENT: Positive for congestion and sore throat  Negative for ear discharge, ear pain, postnasal drip, rhinorrhea, sinus pressure, sinus pain and sneezing  Eyes: Negative  Negative for pain, discharge, redness and itching  Respiratory: Positive for cough  Negative for apnea, choking, chest tightness, shortness of breath, wheezing and stridor  Cardiovascular: Negative  Negative for chest pain and palpitations  Gastrointestinal: Negative  Negative for diarrhea, nausea and vomiting  Endocrine: Negative  Negative for polydipsia, polyphagia and polyuria  Genitourinary: Negative  Negative for decreased urine volume and flank pain  Musculoskeletal: Positive for myalgias  Negative for arthralgias, back pain, neck pain and neck stiffness  Skin: Negative  Negative for color change and rash  Allergic/Immunologic: Negative  Negative for environmental allergies  Neurological: Negative  Negative for dizziness, facial asymmetry, light-headedness, numbness and headaches  Hematological: Negative  Negative for adenopathy  Psychiatric/Behavioral: Negative            Current Medications       Current Outpatient Medications:   •  nirmatrelvir & ritonavir (Paxlovid, 300/100,) tablet therapy pack, Take 3 tablets by mouth 2 (two) times a day for 5 days Take 2 nirmatrelvir tablets + 1 ritonavir tablet together per dose, Disp: 30 tablet, Rfl: 0  •  Ascorbic Acid (VITAMIN C PO), Take by mouth Take 1 in the morning and 2 with dinner, Disp: , Rfl:   •  B Complex Vitamins (B COMPLEX PO), Take by mouth 2 pumps daily, Disp: , Rfl:   •  BIOTIN PO, Take 1 tablet by mouth daily, Disp: , Rfl:   •  BLACK CURRANT SEED OIL PO, Take 1 tablet by mouth 2 (two) times a day, Disp: , Rfl:   •  Cholecalciferol (VITAMIN D3 PO), Take by mouth Take 4 drops daily, Disp: , Rfl:   •  COLLAGEN PO, Take 2 Scoops by mouth daily, Disp: , Rfl:   •  EPINEPHrine (EPIPEN) 0 3 mg/0 3 mL SOAJ, Inject 0 3 mL (0 3 mg total) into a muscle once for 1 dose (Patient not taking: Reported on 3/28/2023), Disp: 0 6 mL, Rfl: 0  •  LUTEIN PO, Take 1 tablet by mouth daily, Disp: , Rfl:   •  LYSINE ACETATE PO, Take 2 tablets by mouth 2 (two) times a day, Disp: , Rfl:   •  Multiple Vitamins-Iron (CHLORELLA PO), Take 3 tablets by mouth daily at bedtime, Disp: , Rfl:   •  Probiotic Product (PROBIOTIC DAILY PO), Take 1 tablet by mouth daily at bedtime, Disp: , Rfl:   •  QUERCETIN PO, Take 1 tablet by mouth 2 (two) times a day, Disp: , Rfl:   •  zinc gluconate 50 mg tablet, Take 50 mg by mouth 2 (two) times a day, Disp: , Rfl:     Current Allergies     Allergies as of 05/07/2023 - Reviewed 05/07/2023   Allergen Reaction Noted   • Cinnamon - food allergy Anaphylaxis 10/15/2020   • Coconut fatty acids - food allergy Anaphylaxis 10/15/2020   • Fish allergy - food allergy Anaphylaxis 10/15/2020            The following portions of the patient's history were reviewed and updated as appropriate: allergies, current medications, past family history, past medical history, past social history, past surgical history and problem list      Past Medical History:   Diagnosis Date   • Allergic    • Disease of thyroid gland        Past Surgical History:   Procedure Laterality Date   • BREAST BIOPSY Left     2yrs ago   • RHINOPLASTY     • SINUS SURGERY     • US GUIDED BREAST BIOPSY LEFT COMPLETE Left 5/1/2019   • WISDOM TOOTH EXTRACTION         Family History   Problem Relation Age of Onset   • Diabetes Mother    • Thyroid disease Mother    • Dementia Father    • Heart disease Father    • Heart failure Father    • Thyroid disease Father    • Breast cancer Sister    • Stroke Maternal Grandmother    • COPD Maternal Grandfather    • Dementia Paternal Grandmother    • Alzheimer's disease Paternal Grandmother    • No Known Problems Daughter    • No Known Problems Maternal Aunt    • Colon cancer Neg Hx    • Mental illness Neg Hx    • Substance Abuse Neg Hx    • Depression Neg Hx          Medications have been verified          Objective   BP (!) 100/46 (BP Location: Right arm, Patient Position: Sitting, Cuff Size: Standard)   Pulse 82   Temp 98 6 °F (37 °C)   Resp 18   LMP 07/20/2021   SpO2 99%        Physical Exam     Physical Exam  Vitals and nursing note reviewed  Constitutional:       General: She is not in acute distress  Appearance: Normal appearance  She is not ill-appearing, toxic-appearing or diaphoretic  Interventions: She is not intubated  HENT:      Head: Normocephalic and atraumatic  Right Ear: Tympanic membrane and ear canal normal  No drainage, swelling or tenderness  No middle ear effusion  Tympanic membrane is not erythematous  Left Ear: Tympanic membrane and ear canal normal  No drainage, swelling or tenderness  No middle ear effusion  Tympanic membrane is not erythematous  Nose: Nose normal  No congestion or rhinorrhea  Mouth/Throat:      Mouth: Mucous membranes are moist  No oral lesions  Pharynx: Uvula midline  No pharyngeal swelling, oropharyngeal exudate, posterior oropharyngeal erythema or uvula swelling  Tonsils: No tonsillar exudate or tonsillar abscesses  2+ on the right  2+ on the left  Eyes:      Extraocular Movements: Extraocular movements intact  Conjunctiva/sclera: Conjunctivae normal       Pupils: Pupils are equal, round, and reactive to light  Neck:      Thyroid: No thyromegaly  Cardiovascular:      Rate and Rhythm: Normal rate and regular rhythm  Pulses: Normal pulses  Heart sounds: Normal heart sounds, S1 normal and S2 normal  Heart sounds not distant  No murmur heard  No friction rub  No gallop  Pulmonary:      Effort: Pulmonary effort is normal  No tachypnea, bradypnea, accessory muscle usage, prolonged expiration, respiratory distress or retractions  She is not intubated  Breath sounds: Normal breath sounds  No stridor, decreased air movement or transmitted upper airway sounds  No decreased breath sounds, wheezing, rhonchi or rales  Abdominal:      General: Bowel sounds are normal       Palpations: Abdomen is soft  Tenderness:  There is no abdominal tenderness  There is no guarding or rebound  Musculoskeletal:         General: Normal range of motion  Cervical back: Full passive range of motion without pain, normal range of motion and neck supple  No tenderness  No spinous process tenderness or muscular tenderness  Normal range of motion  Lymphadenopathy:      Cervical: No cervical adenopathy  Skin:     General: Skin is warm and dry  Capillary Refill: Capillary refill takes less than 2 seconds  Neurological:      General: No focal deficit present  Mental Status: She is alert and oriented to person, place, and time  Cranial Nerves: No cranial nerve deficit     Psychiatric:         Mood and Affect: Mood normal          Behavior: Behavior normal

## 2023-05-07 NOTE — PATIENT INSTRUCTIONS
Please begin Paxlovid as directed  May alternate Tylenol and Ibuprofen as needed, OTC decongestants are also acceptable

## 2023-07-17 ENCOUNTER — OFFICE VISIT (OUTPATIENT)
Dept: FAMILY MEDICINE CLINIC | Facility: CLINIC | Age: 53
End: 2023-07-17
Payer: COMMERCIAL

## 2023-07-17 VITALS
OXYGEN SATURATION: 99 % | HEIGHT: 63 IN | WEIGHT: 95 LBS | RESPIRATION RATE: 16 BRPM | DIASTOLIC BLOOD PRESSURE: 70 MMHG | TEMPERATURE: 96.3 F | SYSTOLIC BLOOD PRESSURE: 108 MMHG | BODY MASS INDEX: 16.83 KG/M2 | HEART RATE: 64 BPM

## 2023-07-17 DIAGNOSIS — Z12.31 ENCOUNTER FOR SCREENING MAMMOGRAM FOR MALIGNANT NEOPLASM OF BREAST: ICD-10-CM

## 2023-07-17 DIAGNOSIS — E03.9 ACQUIRED HYPOTHYROIDISM: ICD-10-CM

## 2023-07-17 DIAGNOSIS — Z00.00 ANNUAL PHYSICAL EXAM: Primary | ICD-10-CM

## 2023-07-17 PROCEDURE — 99396 PREV VISIT EST AGE 40-64: CPT | Performed by: NURSE PRACTITIONER

## 2023-07-17 NOTE — PROGRESS NOTES
[address] [facility]    NAME: Marciano Flores  AGE: 52 y.o. SEX: female  : 1970     DATE: 2023     Assessment and Plan:     Problem List Items Addressed This Visit        Endocrine    Acquired hypothyroidism    Relevant Orders    TSH, 3rd generation with Free T4 reflex   Other Visit Diagnoses     Annual physical exam    -  Primary    Relevant Orders    Comprehensive metabolic panel    Lipid panel    Encounter for screening mammogram for malignant neoplasm of breast        Relevant Orders    Mammo screening bilateral w 3d & cad          Immunizations and preventive care screenings were discussed with patient today. Appropriate education was printed on patient's after visit summary. Counseling:  Alcohol/drug use: discussed moderation in alcohol intake, the recommendations for healthy alcohol use, and avoidance of illicit drug use. Dental Health: discussed importance of regular tooth brushing, flossing, and dental visits. Injury prevention: discussed safety/seat belts, safety helmets, smoke detectors, carbon dioxide detectors, and smoking near bedding or upholstery. Sexual health: discussed sexually transmitted diseases, partner selection, use of condoms, avoidance of unintended pregnancy, and contraceptive alternatives. Exercise: the importance of regular exercise/physical activity was discussed. Recommend exercise 3-5 times per week for at least 30 minutes. BMI Counseling: Body mass index is 16.83 kg/m². The BMI is below normal. Patient advised to gain weight. Rationale for BMI follow-up plan is due to patient being underweight. Depression Screening and Follow-up Plan: Patient was screened for depression during today's encounter. They screened negative with a PHQ-2 score of 0. Return in about 1 year (around 2024).      Chief Complaint:     Chief Complaint   Patient presents with   • Physical Exam History of Present Illness:     Adult Annual Physical   Patient here for a comprehensive physical exam. The patient reports no problems. Patient recently was laid-off which is happy about it because more time to spend with children. Concerned about shingles and thinking about obtaining the Shingles vaccine but no sure about it. Diet and Physical Activity  Diet/Nutrition: well balanced diet, limited junk food, consuming 3-5 servings of fruits/vegetables daily and adequate whole grain intake. Exercise: moderate cardiovascular exercise, 5-7 times a week on average and 30-60 minutes on average. Depression Screening  PHQ-2/9 Depression Screening    Little interest or pleasure in doing things: 0 - not at all  Feeling down, depressed, or hopeless: 0 - not at all  PHQ-2 Score: 0  PHQ-2 Interpretation: Negative depression screen       General Health  Sleep: sleeps well and gets 7-8 hours of sleep on average. Hearing: normal - bilateral.  Vision: no vision problems. Dental: regular dental visits and brushes teeth twice daily. /GYN Health  Patient is: postmenopausal  Last menstrual period: unknown     Review of Systems:     Review of Systems   Constitutional: Negative for activity change, appetite change and unexpected weight change. HENT: Negative for dental problem, ear pain, hearing loss, nosebleeds, sneezing, sore throat, tinnitus and trouble swallowing. Eyes: Negative for visual disturbance. Respiratory: Negative for cough, chest tightness, shortness of breath and wheezing. Cardiovascular: Negative for chest pain, palpitations and leg swelling. Gastrointestinal: Negative for abdominal distention, abdominal pain, constipation, diarrhea and nausea. Endocrine: Negative for polydipsia, polyphagia and polyuria. Genitourinary: Negative. Musculoskeletal: Negative for arthralgias, back pain, myalgias and neck pain. Skin: Negative for color change and rash.    Allergic/Immunologic: Negative for environmental allergies. Neurological: Negative for dizziness, weakness, light-headedness and headaches. Hematological: Negative. Psychiatric/Behavioral: Negative for dysphoric mood and sleep disturbance. The patient is nervous/anxious. Past Medical History:     Past Medical History:   Diagnosis Date   • Allergic    • Disease of thyroid gland       Past Surgical History:     Past Surgical History:   Procedure Laterality Date   • BREAST BIOPSY Left     2yrs ago   • RHINOPLASTY     • SINUS SURGERY     • US GUIDED BREAST BIOPSY LEFT COMPLETE Left 5/1/2019   • WISDOM TOOTH EXTRACTION        Social History:     Social History     Socioeconomic History   • Marital status: /Civil Union     Spouse name: Nick   • Number of children: 2   • Years of education: None   • Highest education level: None   Occupational History   • None   Tobacco Use   • Smoking status: Never     Passive exposure: Never   • Smokeless tobacco: Never   Vaping Use   • Vaping Use: Never used   Substance and Sexual Activity   • Alcohol use: Never   • Drug use: Never   • Sexual activity: Yes     Partners: Male     Birth control/protection: None   Other Topics Concern   • None   Social History Narrative   • None     Social Determinants of Health     Financial Resource Strain: Not on file   Food Insecurity: Not on file   Transportation Needs: No Transportation Needs (3/31/2021)    PRAPARE - Transportation    • Lack of Transportation (Medical): No    • Lack of Transportation (Non-Medical): No   Physical Activity: Sufficiently Active (3/31/2021)    Exercise Vital Sign    • Days of Exercise per Week: 6 days    • Minutes of Exercise per Session: 60 min   Stress: No Stress Concern Present (3/31/2021)    [facility]    • Feeling of Stress :  Only a little   Social Connections: Unknown (3/31/2021)    Social Connection and Isolation Panel [NHANES]    • Frequency of Communication with Friends and Family: Not on file    • Frequency of Social Gatherings with Friends and Family: Not on file    • Attends Jainism Services: Not on file    • Active Member of Clubs or Organizations: Not on file    • Attends Club or Organization Meetings: Not on file    • Marital Status:    Intimate Partner Violence: Not At Risk (3/31/2021)    Humiliation, Afraid, Rape, and Kick questionnaire    • Fear of Current or Ex-Partner: No    • Emotionally Abused: No    • Physically Abused: No    • Sexually Abused: No   Housing Stability: Not on file      Family History:     Family History   Problem Relation Age of Onset   • Diabetes Mother    • Thyroid disease Mother    • Dementia Father    • Heart disease Father    • Heart failure Father    • Thyroid disease Father    • Breast cancer Sister    • Stroke Maternal Grandmother    • COPD Maternal Grandfather    • Dementia Paternal Grandmother    • Alzheimer's disease Paternal Grandmother    • No Known Problems Daughter    • No Known Problems Maternal Aunt    • Colon cancer Neg Hx    • Mental illness Neg Hx    • Substance Abuse Neg Hx    • Depression Neg Hx       Current Medications:     Current Outpatient Medications   Medication Sig Dispense Refill   • Ascorbic Acid (VITAMIN C PO) Take by mouth Take 1 in the morning and 2 with dinner     • B Complex Vitamins (B COMPLEX PO) Take by mouth 2 pumps daily     • BIOTIN PO Take 1 tablet by mouth daily     • BLACK CURRANT SEED OIL PO Take 1 tablet by mouth 2 (two) times a day     • Cholecalciferol (VITAMIN D3 PO) Take by mouth Take 4 drops daily     • COLLAGEN PO Take 2 Scoops by mouth daily     • EPINEPHrine (EPIPEN) 0.3 mg/0.3 mL SOAJ Inject 0.3 mL (0.3 mg total) into a muscle once for 1 dose 0.6 mL 0   • LUTEIN PO Take 1 tablet by mouth daily     • LYSINE ACETATE PO Take 2 tablets by mouth 2 (two) times a day     • Multiple Vitamins-Iron (CHLORELLA PO) Take 3 tablets by mouth daily at bedtime     • Probiotic Product (PROBIOTIC DAILY PO) Take 1 tablet by mouth daily at bedtime     • QUERCETIN PO Take 1 tablet by mouth 2 (two) times a day     • zinc gluconate 50 mg tablet Take 50 mg by mouth 2 (two) times a day       No current facility-administered medications for this visit. Allergies: Allergies   Allergen Reactions   • Cinnamon - Food Allergy Anaphylaxis   • Coconut Fatty Acids - Food Allergy Anaphylaxis   • Fish Allergy - Food Allergy Anaphylaxis      Physical Exam:     /70 (BP Location: Right arm, Patient Position: Sitting)   Pulse 64   Temp (!) 96.3 °F (35.7 °C)   Resp 16   Ht 5' 3" (1.6 m)   Wt 43.1 kg (95 lb)   LMP 07/02/2021   SpO2 99%   BMI 16.83 kg/m² (Reviewed)    Physical Exam  Vitals reviewed. Constitutional:       General: She is not in acute distress. Appearance: Normal appearance. She is well-developed and well-groomed. She is not ill-appearing. HENT:      Head: Normocephalic and atraumatic. Right Ear: Tympanic membrane, ear canal and external ear normal.      Left Ear: Tympanic membrane, ear canal and external ear normal.      Nose: Nose normal.      Mouth/Throat:      Lips: Pink. Mouth: Mucous membranes are moist.      Pharynx: Oropharynx is clear. Eyes:      General: Lids are normal.      Extraocular Movements: Extraocular movements intact. Conjunctiva/sclera: Conjunctivae normal.      Pupils: Pupils are equal, round, and reactive to light. Neck:      Thyroid: No thyromegaly or thyroid tenderness. Trachea: Trachea and phonation normal.   Cardiovascular:      Rate and Rhythm: Normal rate and regular rhythm. Pulses: Normal pulses. Radial pulses are 2+ on the right side and 2+ on the left side. Dorsalis pedis pulses are 2+ on the right side and 2+ on the left side. Posterior tibial pulses are 2+ on the right side and 2+ on the left side. Heart sounds: Normal heart sounds. No murmur heard.   Pulmonary:      Effort: Pulmonary effort is normal.      Breath sounds: Normal breath sounds. Abdominal:      General: Abdomen is flat. Bowel sounds are normal.      Palpations: Abdomen is soft. Tenderness: There is no abdominal tenderness. Musculoskeletal:         General: Normal range of motion. Cervical back: Neck supple. Right lower leg: No edema. Left lower leg: No edema. Lymphadenopathy:      Cervical: No cervical adenopathy. Skin:     General: Skin is warm and dry. Capillary Refill: Capillary refill takes less than 2 seconds. Neurological:      General: No focal deficit present. Mental Status: She is alert and oriented to person, place, and time. Cranial Nerves: Cranial nerves 2-12 are intact. Psychiatric:         Mood and Affect: Mood is anxious. Behavior: Behavior normal. Behavior is cooperative.           [address]

## 2024-04-04 ENCOUNTER — OFFICE VISIT (OUTPATIENT)
Dept: FAMILY MEDICINE CLINIC | Facility: CLINIC | Age: 54
End: 2024-04-04
Payer: COMMERCIAL

## 2024-04-04 ENCOUNTER — TELEPHONE (OUTPATIENT)
Age: 54
End: 2024-04-04

## 2024-04-04 VITALS
OXYGEN SATURATION: 97 % | TEMPERATURE: 96.7 F | HEART RATE: 74 BPM | DIASTOLIC BLOOD PRESSURE: 74 MMHG | BODY MASS INDEX: 16.89 KG/M2 | HEIGHT: 63 IN | WEIGHT: 95.31 LBS | SYSTOLIC BLOOD PRESSURE: 124 MMHG | RESPIRATION RATE: 16 BRPM

## 2024-04-04 DIAGNOSIS — U07.1 COVID: Primary | ICD-10-CM

## 2024-04-04 LAB
SARS-COV-2 AG UPPER RESP QL IA: POSITIVE
SL AMB POCT RAPID FLU A: NORMAL
SL AMB POCT RAPID FLU B: NORMAL
VALID CONTROL: ABNORMAL

## 2024-04-04 PROCEDURE — 87804 INFLUENZA ASSAY W/OPTIC: CPT | Performed by: FAMILY MEDICINE

## 2024-04-04 PROCEDURE — 99213 OFFICE O/P EST LOW 20 MIN: CPT | Performed by: FAMILY MEDICINE

## 2024-04-04 PROCEDURE — 87811 SARS-COV-2 COVID19 W/OPTIC: CPT | Performed by: FAMILY MEDICINE

## 2024-04-04 RX ORDER — NIRMATRELVIR AND RITONAVIR 300-100 MG
3 KIT ORAL 2 TIMES DAILY
Qty: 30 TABLET | Refills: 0 | Status: SHIPPED | OUTPATIENT
Start: 2024-04-04 | End: 2024-04-09

## 2024-04-04 NOTE — PATIENT INSTRUCTIONS
How to Recover from COVID-19 at Home   WHAT YOU NEED TO KNOW:   COVID-19 can cause a range of symptoms, from mild to severe. If you do not need to be treated in a hospital, you will be given instructions to use at home. You will need to watch for worsening symptoms and seek immediate care if needed. You will also need to stay physically apart from others so you do not spread the virus to anyone. It is not known if a person can be infected with the virus again after recovering from COVID-19. It is also not known if or for how long the virus can continue to be passed to others.  DISCHARGE INSTRUCTIONS:   Call your local emergency number (911 in the [place]) if:   You have trouble breathing or shortness of breath at rest.    You have chest pain or pressure that lasts longer than 5 minutes.    You become confused or hard to wake.    Return to the emergency department if:   The skin on your face, fingers, or toes look blue or darker than usual.      Call your doctor if:   You have a fever.    You have new, returning, or worsening symptoms.    Someone in your home has symptoms of COVID-19.    You have questions or concerns about your condition or care.    Medicines:  You may need any of the following:  Decongestants  help reduce nasal congestion and help you breathe more easily. If you take decongestant pills, they may make you feel restless or cause problems with your sleep. Do not use decongestant sprays for more than a few days.    Cough suppressants  help reduce coughing. Ask your healthcare provider which type of cough medicine is best for you.    NSAIDs , such as ibuprofen, help decrease swelling, pain, and fever. NSAIDs can cause stomach bleeding or kidney problems in certain people. If you take blood thinner medicine, always ask your healthcare provider if NSAIDs are safe for you. Always read the medicine label and follow directions.    Acetaminophen  decreases pain and fever. It is available without a doctor's order.  Ask how much to take and how often to take it. Follow directions. Read the labels of all other medicines you are using to see if they also contain acetaminophen, or ask your doctor or pharmacist. Acetaminophen can cause liver damage if not taken correctly.    Take your medicine as directed.  Contact your healthcare provider if you think your medicine is not helping or if you have side effects. Tell your provider if you are allergic to any medicine. Keep a list of the medicines, vitamins, and herbs you take. Include the amounts, and when and why you take them. Bring the list or the pill bottles to follow-up visits. Carry your medicine list with you in case of an emergency.    To soothe a sore throat,  gargle with warm salt water, or use throat lozenges or a throat spray. Drink more liquids to thin and loosen mucus and to prevent dehydration. Use decongestants or saline drops as directed for nasal congestion.  Keep others safe while you are recovering at home:  Healthcare providers will give you specific instructions to follow. The following are general guidelines to remind you how to keep others safe until you are well:  Wash your hands often.  Use soap and water as much as possible. You can use hand  that contains alcohol if soap and water are not available. Do not share towels with anyone. If you use paper towels, throw them away in a lined trash can kept in your room or area. Use a covered trash can, if possible.         Cover sneezes and coughs.  Turn your face away and cover your mouth and nose with a tissue. Throw the tissue away. Use the bend of your arm if a tissue is not available. Then wash your hands well with soap and water or use hand .    Wear a face covering (mask) around others.  Use a cloth covering with at least 2 layers. You can also create layers by putting a cloth covering over a disposable non-medical mask. Cover your mouth and your nose. The covering should fit snugly against  the bridge of your nose. Securely fasten it under your chin and on the sides of your face.         Do not go out of your home unless it is necessary.  If possible, ask someone who is not infected to go out for groceries, medicines, and household items. Ask your healthcare provider for other ways to have appointments. Some providers offer phone, video, or other types of appointments. If you need to be seen in person, call ahead to make sure the office will be ready for you.    Do not let anyone into your home, room, or area unless it is necessary.  If possible, stay in a separate area or room of your home if you live with others. No one should go into the area or room except to give you care. Wear a face covering around others. Remind others to wear face coverings and to wash their hands.     Talk to your healthcare provider about your baby.  If possible, ask someone who is well to care for your baby. You can put breast milk in bottles for the person to use, if needed. Wear a clean face covering if you need to breastfeed or express or pump breast milk. Tell your provider if you have any questions or concerns about caring for or bonding with your baby. He or she will tell you when to bring your baby in for check-ups and vaccines. He or she will also tell you what to do if you think your baby was infected with the coronavirus.    Follow directions from your healthcare provider for when you can be around others.  Your provider will give you specific instructions. In general, you will need to wait 5 to 10 days after symptoms started or you got a positive test result. Wait even if you got a COVID-19 vaccine and 1 or 2 boosters. You can still be infected after you are vaccinated. You may spread the virus to others without knowing you are infected. Do not  travel until your provider says it is okay.       Follow up with your doctor as directed:  Write down your questions so you remember to ask them during your visits.  For  more information:   Centers for Disease Control and Prevention  [address] [place] [address]  Phone: 8- 148 - 224-5906  Web Address: http://www.cdc.gov    © Copyright Merative 2023 Information is for End User's use only and may not be sold, redistributed or otherwise used for commercial purposes.  The above information is an  only. It is not intended as medical advice for individual conditions or treatments. Talk to your doctor, nurse or pharmacist before following any medical regimen to see if it is safe and effective for you.

## 2024-04-04 NOTE — LETTER
April 4, 2024     Patient: Pam Grey   YOB: 1970   Date of Visit: 4/4/2024       To Whom It May Concern:    It is my medical opinion that Pam Grey should remain out of work until 4/8/24 .    If you have any questions or concerns, please don't hesitate to call.         Sincerely,        Pierce Torres MD    CC: No Recipients
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
## 2024-04-04 NOTE — TELEPHONE ENCOUNTER
Pt called stating her pharmacy does not have the Paxlovid script from today's visit, please review and send to the [id number]

## 2024-04-04 NOTE — TELEPHONE ENCOUNTER
I called pharmacy and prescription was received and it is ready for . Patient was advised.      She also wanted to know when you think it would be ok for her to start exercising/running again?

## 2024-04-08 ENCOUNTER — PATIENT MESSAGE (OUTPATIENT)
Dept: FAMILY MEDICINE CLINIC | Facility: CLINIC | Age: 54
End: 2024-04-08

## 2024-04-08 DIAGNOSIS — T78.2XXA ANAPHYLAXIS, INITIAL ENCOUNTER: ICD-10-CM

## 2024-04-17 ENCOUNTER — TELEPHONE (OUTPATIENT)
Age: 54
End: 2024-04-17

## 2024-04-17 DIAGNOSIS — T78.2XXA ANAPHYLAXIS, INITIAL ENCOUNTER: ICD-10-CM

## 2024-04-17 RX ORDER — EPINEPHRINE 0.3 MG/.3ML
0.3 INJECTION SUBCUTANEOUS ONCE
Qty: 0.6 ML | Refills: 0 | Status: SHIPPED | OUTPATIENT
Start: 2024-04-17 | End: 2024-04-18 | Stop reason: SDUPTHER

## 2024-04-17 NOTE — TELEPHONE ENCOUNTER
The patient called [facility] told her the epipen needs pre certification   please call the patient when it is approved so she can go pick it up   thank you

## 2024-04-18 RX ORDER — EPINEPHRINE 0.3 MG/.3ML
0.3 INJECTION SUBCUTANEOUS ONCE
Qty: 0.6 ML | Refills: 0 | Status: SHIPPED | OUTPATIENT
Start: 2024-04-18 | End: 2024-04-18

## 2024-04-18 NOTE — TELEPHONE ENCOUNTER
Pam called in for an update on the authorization For her Epipen. She stated that she would call the insurance company her self as well because her Epipen has been  for a year. She would like to be called once authorized. Please advise thank you.

## 2024-04-18 NOTE — TELEPHONE ENCOUNTER
Unfortunately, we do not do prior auths in the office. The original message was sent to the prior auth pool to initiate. Can someone please assist and begin auth for Epi-pen. If you could kindly keep the patient updated.

## 2024-04-18 NOTE — TELEPHONE ENCOUNTER
Sandy from [facility] called and states if generic epipen is sent to pharmacy does not require a prior authorization. If there is any questions Sandy can be reached at [phone]

## 2024-07-18 ENCOUNTER — RA CDI HCC (OUTPATIENT)
Dept: OTHER | Facility: HOSPITAL | Age: 54
End: 2024-07-18

## 2024-10-01 ENCOUNTER — TELEPHONE (OUTPATIENT)
Age: 54
End: 2024-10-01

## 2024-10-01 ENCOUNTER — PROCEDURE VISIT (OUTPATIENT)
Dept: FAMILY MEDICINE CLINIC | Facility: CLINIC | Age: 54
End: 2024-10-01

## 2024-10-01 VITALS
HEART RATE: 65 BPM | SYSTOLIC BLOOD PRESSURE: 106 MMHG | BODY MASS INDEX: 16.57 KG/M2 | TEMPERATURE: 97.6 F | HEIGHT: 63 IN | OXYGEN SATURATION: 93 % | DIASTOLIC BLOOD PRESSURE: 64 MMHG | WEIGHT: 93.5 LBS

## 2024-10-01 DIAGNOSIS — R30.0 DYSURIA: ICD-10-CM

## 2024-10-01 DIAGNOSIS — Z11.59 ENCOUNTER FOR HEPATITIS C SCREENING TEST FOR LOW RISK PATIENT: ICD-10-CM

## 2024-10-01 DIAGNOSIS — Z01.419 WELL FEMALE EXAM WITH ROUTINE GYNECOLOGICAL EXAM: Primary | ICD-10-CM

## 2024-10-01 DIAGNOSIS — N95.2 VAGINAL ATROPHY: ICD-10-CM

## 2024-10-01 DIAGNOSIS — Z12.4 CERVICAL CANCER SCREENING: ICD-10-CM

## 2024-10-01 DIAGNOSIS — R63.4 UNINTENDED WEIGHT LOSS: ICD-10-CM

## 2024-10-01 DIAGNOSIS — Z12.31 BREAST CANCER SCREENING BY MAMMOGRAM: ICD-10-CM

## 2024-10-01 DIAGNOSIS — Z12.11 COLON CANCER SCREENING: ICD-10-CM

## 2024-10-01 LAB
SL AMB  POCT GLUCOSE, UA: NORMAL
SL AMB LEUKOCYTE ESTERASE,UA: NORMAL
SL AMB POCT BILIRUBIN,UA: NORMAL
SL AMB POCT BLOOD,UA: NORMAL
SL AMB POCT CLARITY,UA: CLEAR
SL AMB POCT COLOR,UA: YELLOW
SL AMB POCT KETONES,UA: NORMAL
SL AMB POCT NITRITE,UA: NORMAL
SL AMB POCT PH,UA: 6
SL AMB POCT SPECIFIC GRAVITY,UA: 1.01
SL AMB POCT URINE PROTEIN: NORMAL
SL AMB POCT UROBILINOGEN: NORMAL

## 2024-10-01 PROCEDURE — G0145 SCR C/V CYTO,THINLAYER,RESCR: HCPCS | Performed by: FAMILY MEDICINE

## 2024-10-01 PROCEDURE — G0476 HPV COMBO ASSAY CA SCREEN: HCPCS | Performed by: FAMILY MEDICINE

## 2024-10-01 NOTE — LETTER
October 1, 2024     Patient: Pam Grey   YOB: 1970   Date of Visit: 10/1/2024       To Whom It May Concern:    Based on her physical dated 10/1/2024, Pam Grey appears to be in good health.    If you have any questions or concerns, please don't hesitate to call.         Sincerely,        Pierce Torres MD    CC: No Recipients

## 2024-10-01 NOTE — PROGRESS NOTES
Adult Annual Physical  Name: Pam Grey      : 1970      MRN: [id number]  Encounter Provider: Pierce Torres MD  Encounter Date: 10/1/2024   Encounter department: [facility]    Assessment & Plan  Well female exam with routine gynecological exam    Orders:    Lipid Panel with Direct LDL reflex; Future    CBC; Future    Comprehensive metabolic panel; Future    Encounter for hepatitis C screening test for low risk patient    Orders:    Hepatitis C antibody; Future    Unintended weight loss    Orders:    TSH, 3rd generation with Free T4 reflex; Future    Breast cancer screening by mammogram    Orders:    Mammo screening bilateral w 3d and cad; Future    Cervical cancer screening    Orders:    Liquid-based pap, screening; Future    Liquid-based pap, screening    HPV High Risk    Colon cancer screening    Orders:    Cologuard    Dysuria    Orders:    POCT urine dip    Vaginal atrophy  Discussed possible treatment with topical estrogen, declines for now        Immunizations and preventive care screenings were discussed with patient today. Appropriate education was printed on patient's after visit summary.    Counseling:  Alcohol/drug use: discussed moderation in alcohol intake, the recommendations for healthy alcohol use, and avoidance of illicit drug use.  Dental Health: discussed importance of regular tooth brushing, flossing, and dental visits.  Exercise: the importance of regular exercise/physical activity was discussed. Recommend exercise 3-5 times per week for at least 30 minutes.          History of Present Illness     Adult Annual Physical:  Patient presents for annual physical.     Diet and Physical Activity:  - Diet/Nutrition: consuming 3-5 servings of fruits/vegetables daily and well balanced diet.  - Exercise: strength training exercises, moderate cardiovascular exercise and 5-7 times a week on average.    Depression Screening:  - PHQ-2 Score: 0    General  "Health:  - Sleep: sleeps well.  - Hearing: normal hearing bilateral ears.  - Vision: goes for regular eye exams, wears glasses and wears contacts. dry eye, uses [facility]. Restasis not covered well.  - Dental: regular dental visits.    Review of Systems   Constitutional:  Negative for chills and fever.   HENT:  Negative for congestion and sore throat.    Eyes:  Negative for pain and visual disturbance.   Respiratory:  Negative for cough and shortness of breath.    Cardiovascular:  Negative for chest pain and palpitations.   Gastrointestinal:  Negative for abdominal pain and nausea.   Genitourinary:  Negative for dysuria.   Musculoskeletal:  Negative for arthralgias and myalgias.   Skin:  Negative for rash and wound.   Neurological:  Negative for dizziness and headaches.   All other systems reviewed and are negative.    Medical History Reviewed by provider this encounter:         Objective     /64   Pulse 65   Temp 97.6 °F (36.4 °C)   Ht 5' 3.39\" (1.61 m)   Wt 42.4 kg (93 lb 8 oz)   LMP 07/02/2021   SpO2 93%   BMI 16.36 kg/m²     Physical Exam  Vitals and nursing note reviewed. Exam conducted with a chaperone present.   Constitutional:       General: She is not in acute distress.     Appearance: She is underweight. She is not ill-appearing.   HENT:      Head: Normocephalic and atraumatic.      Right Ear: Tympanic membrane, ear canal and external ear normal. No middle ear effusion.      Left Ear: Tympanic membrane, ear canal and external ear normal.  No middle ear effusion.      Nose: Nose normal. No congestion or rhinorrhea.      Mouth/Throat:      Lips: Pink.      Mouth: Mucous membranes are moist.      Pharynx: Oropharynx is clear. Uvula midline. No oropharyngeal exudate.      Tonsils: No tonsillar exudate.   Eyes:      General: Lids are normal.      Extraocular Movements: Extraocular movements intact.      Conjunctiva/sclera: Conjunctivae normal.      Pupils: Pupils are equal, round, and reactive to " light.   Neck:      Thyroid: No thyromegaly.      Trachea: No tracheal deviation.   Cardiovascular:      Rate and Rhythm: Normal rate and regular rhythm.      Pulses: Normal pulses.      Heart sounds: Normal heart sounds, S1 normal and S2 normal. No murmur heard.  Pulmonary:      Effort: Pulmonary effort is normal. No respiratory distress.      Breath sounds: Normal breath sounds. No decreased breath sounds, wheezing, rhonchi or rales.   Abdominal:      General: Bowel sounds are normal. There is no distension.      Palpations: Abdomen is soft.      Tenderness: There is no abdominal tenderness.   Genitourinary:     Labia:         Right: No lesion.         Left: No lesion.       Vagina: Normal.      Cervix: Normal.      Uterus: Normal.       Adnexa: Right adnexa normal.      Rectum: No external hemorrhoid.   Musculoskeletal:      Right lower leg: No edema.      Left lower leg: No edema.   Lymphadenopathy:      Cervical: No cervical adenopathy.   Skin:     General: Skin is warm and dry.      Capillary Refill: Capillary refill takes less than 2 seconds.   Neurological:      Mental Status: She is alert and oriented to person, place, and time.      Deep Tendon Reflexes: Reflexes normal.      Reflex Scores:       Patellar reflexes are 2+ on the right side and 2+ on the left side.  Psychiatric:         Attention and Perception: Attention normal.         Mood and Affect: Mood normal.         Thought Content: Thought content does not include suicidal ideation.

## 2024-10-01 NOTE — TELEPHONE ENCOUNTER
Patient called asking if today's PAP/Physical and Shingles vaccine are able to be billed as preventative, so they are covered by her insurance.

## 2024-10-04 ENCOUNTER — CLINICAL SUPPORT (OUTPATIENT)
Dept: FAMILY MEDICINE CLINIC | Facility: CLINIC | Age: 54
End: 2024-10-04
Payer: COMMERCIAL

## 2024-10-04 DIAGNOSIS — Z23 IMMUNIZATION DUE: Primary | ICD-10-CM

## 2024-10-04 LAB
LAB AP GYN PRIMARY INTERPRETATION: NORMAL
Lab: NORMAL

## 2024-10-04 PROCEDURE — 90750 HZV VACC RECOMBINANT IM: CPT

## 2024-10-04 PROCEDURE — 90471 IMMUNIZATION ADMIN: CPT

## 2024-10-07 ENCOUNTER — NURSE TRIAGE (OUTPATIENT)
Age: 54
End: 2024-10-07

## 2024-10-07 NOTE — TELEPHONE ENCOUNTER
"Pt had her shingles vaccine on 10/04/2024.  She has redness and swelling at the injection site, fatigue, low grade fever.  All these symptoms are improving.  She also c/o itching at the injection site, her hands and her legs.  There is no visible rash.  She is using Benadryl and that is helping.  She denies any oral swelling or difficulty breathing, she did have a metallic taste in her mouth the first day after the vaccine.  She wants to make sure that this does not sound like an allergy to the vaccine and that she will be ok to get the second vaccine in March.  Please advise.     Reason for Disposition   Immunization reactions, questions about    Answer Assessment - Initial Assessment Questions  1. SYMPTOMS: \"What is the main symptom?\" (e.g., redness, swelling, pain)       Redness and swelling at injection site   2. ONSET: \"When was the vaccine (shot) given?\" \"How much later did the symptoms begin?\" (e.g., hours, days ago)       10/04/2024  3. SEVERITY: \"How bad is it?\"       Mild   4. FEVER: \"Is there a fever?\" If Yes, ask: \"What is it, how was it measured, and when did it start?\"       Low grade   5. IMMUNIZATIONS GIVEN: \"What shots have you recently received?\"      Shingles   6. PAST REACTIONS: \"Have you reacted to immunizations before?\" If Yes, ask: \"What happened?\"      no  7. OTHER SYMPTOMS: \"Do you have any other symptoms?\"      no    Protocols used: Immunization Reactions-ADULT-[place]    "

## 2024-10-07 NOTE — TELEPHONE ENCOUNTER
Regarding: possible vaccine reaction  ----- Message from Bridgette RADFORD sent at 10/7/2024  9:59 AM EDT -----  Message sent via [employer].   Hi Dr. Jacobs,  also I have some heart racing and muscle aches is that normal too after the vaccine?  Thank you

## 2024-10-10 NOTE — TELEPHONE ENCOUNTER
Sorry for delay. Given itching is widespread, I do not recommend she get the booster. It could be a sign of allergy to a component of the vaccine, and the 2nd shot could cause a worse reaction.

## 2024-10-10 NOTE — TELEPHONE ENCOUNTER
Pt is going to send mychart picture to show rash. Pt would like to know if she is still protected regarding the shingles since she cannot get the second shot. States rash is itchy, red, looks like hives and hydrocortisone cream is not helping.

## 2024-10-16 LAB — COLOGUARD RESULT REPORTABLE: NEGATIVE

## 2024-10-31 ENCOUNTER — OFFICE VISIT (OUTPATIENT)
Dept: FAMILY MEDICINE CLINIC | Facility: CLINIC | Age: 54
End: 2024-10-31

## 2024-10-31 VITALS
HEIGHT: 63 IN | DIASTOLIC BLOOD PRESSURE: 64 MMHG | HEART RATE: 65 BPM | BODY MASS INDEX: 16.48 KG/M2 | WEIGHT: 93 LBS | TEMPERATURE: 97.8 F | OXYGEN SATURATION: 98 % | SYSTOLIC BLOOD PRESSURE: 106 MMHG

## 2024-10-31 DIAGNOSIS — J01.00 ACUTE NON-RECURRENT MAXILLARY SINUSITIS: Primary | ICD-10-CM

## 2024-10-31 NOTE — PROGRESS NOTES
Ambulatory Visit  Name: Pam Grey      : 1970      MRN: [id number]  Encounter Provider: MINESH Guido  Encounter Date: 10/31/2024   Encounter department: [facility]    Assessment & Plan  Acute non-recurrent maxillary sinusitis    Orders:    amoxicillin-clavulanate (AUGMENTIN) 875-125 mg per tablet; Take 1 tablet by mouth every 12 (twelve) hours for 7 days       History of Present Illness     54 y.o.female presenting with sore throat and post nasal drip for the past 3 days. She reports yesterday she started to develop nasal congestion, sinus pressure left eye drainage, lightheadedness and chest tightness. She denies fever, chills, body aches, headache or cough.      History obtained from : patient    Review of Systems   Constitutional:  Negative for chills, fatigue and fever.   HENT:  Positive for congestion, ear pain, postnasal drip, rhinorrhea, sinus pressure and sore throat. Negative for sinus pain.    Eyes:  Positive for discharge.   Respiratory:  Positive for chest tightness. Negative for cough, shortness of breath and wheezing.    Cardiovascular: Negative.    Gastrointestinal: Negative.    Genitourinary: Negative.    Musculoskeletal: Negative.    Neurological:  Positive for light-headedness and headaches. Negative for dizziness.   Psychiatric/Behavioral: Negative.       Current Outpatient Medications on File Prior to Visit   Medication Sig Dispense Refill    Ascorbic Acid (VITAMIN C PO) Take by mouth Take 1 in the morning and 2 with dinner      B Complex Vitamins (B COMPLEX PO) Take by mouth 2 pumps daily      BIOTIN PO Take 1 tablet by mouth daily      BLACK CURRANT SEED OIL PO Take 1 tablet by mouth 2 (two) times a day      Cholecalciferol (VITAMIN D3 PO) Take by mouth Take 4 drops daily      COLLAGEN PO Take 2 Scoops by mouth daily      EPINEPHrine (EPIPEN) 0.3 mg/0.3 mL SOAJ Inject 0.3 mL (0.3 mg total) into a muscle once for 1 dose (Patient not taking: Reported  "on 10/1/2024) 0.6 mL 0    LUTEIN PO Take 1 tablet by mouth daily      LYSINE ACETATE PO Take 2 tablets by mouth 2 (two) times a day      Multiple Vitamins-Iron (CHLORELLA PO) Take 3 tablets by mouth daily at bedtime      Probiotic Product (PROBIOTIC DAILY PO) Take 1 tablet by mouth daily at bedtime      QUERCETIN PO Take 1 tablet by mouth 2 (two) times a day      zinc gluconate 50 mg tablet Take 50 mg by mouth 2 (two) times a day       No current facility-administered medications on file prior to visit.          Objective     /64   Pulse 65   Temp 97.8 °F (36.6 °C)   Ht 5' 3\" (1.6 m)   Wt 42.2 kg (93 lb)   LMP 07/02/2021   SpO2 98%   BMI 16.47 kg/m² (Reviewed)    Physical Exam  Vitals reviewed.   Constitutional:       General: She is not in acute distress.     Appearance: She is not ill-appearing.   HENT:      Head: Normocephalic and atraumatic.      Right Ear: Tympanic membrane, ear canal and external ear normal.      Left Ear: Tympanic membrane, ear canal and external ear normal.      Nose: Nasal tenderness, mucosal edema and congestion present.      Right Sinus: Maxillary sinus tenderness present.      Left Sinus: Maxillary sinus tenderness present.      Mouth/Throat:      Lips: Pink.      Mouth: Mucous membranes are moist.      Pharynx: Oropharynx is clear.   Eyes:      General: Lids are normal.         Left eye: Discharge present.     Extraocular Movements: Extraocular movements intact.      Conjunctiva/sclera: Conjunctivae normal.      Pupils: Pupils are equal, round, and reactive to light.     Cardiovascular:      Rate and Rhythm: Normal rate and regular rhythm.      Heart sounds: Normal heart sounds.   Pulmonary:      Effort: Pulmonary effort is normal.      Breath sounds: Normal breath sounds.   Musculoskeletal:      Cervical back: Neck supple.   Skin:     General: Skin is warm and dry.      Capillary Refill: Capillary refill takes less than 2 seconds.   Neurological:      Mental Status: She is " alert and oriented to person, place, and time.   Psychiatric:         Mood and Affect: Mood normal.         Behavior: Behavior normal.

## 2024-11-01 ENCOUNTER — PATIENT MESSAGE (OUTPATIENT)
Dept: FAMILY MEDICINE CLINIC | Facility: CLINIC | Age: 54
End: 2024-11-01

## 2024-11-01 DIAGNOSIS — H10.9 BACTERIAL CONJUNCTIVITIS OF LEFT EYE: Primary | ICD-10-CM

## 2024-11-01 RX ORDER — TOBRAMYCIN 3 MG/ML
1 SOLUTION/ DROPS OPHTHALMIC
Qty: 5 ML | Refills: 0 | Status: SHIPPED | OUTPATIENT
Start: 2024-11-01 | End: 2024-11-06

## 2024-11-09 ENCOUNTER — PATIENT MESSAGE (OUTPATIENT)
Dept: FAMILY MEDICINE CLINIC | Facility: CLINIC | Age: 54
End: 2024-11-09

## 2024-11-09 DIAGNOSIS — J01.00 ACUTE NON-RECURRENT MAXILLARY SINUSITIS: Primary | ICD-10-CM

## 2024-11-11 ENCOUNTER — PATIENT MESSAGE (OUTPATIENT)
Dept: FAMILY MEDICINE CLINIC | Facility: CLINIC | Age: 54
End: 2024-11-11

## 2024-11-11 RX ORDER — DOXYCYCLINE 100 MG/1
100 CAPSULE ORAL EVERY 12 HOURS SCHEDULED
Qty: 14 CAPSULE | Refills: 0 | Status: SHIPPED | OUTPATIENT
Start: 2024-11-11 | End: 2024-11-18

## 2024-11-12 ENCOUNTER — TELEPHONE (OUTPATIENT)
Age: 54
End: 2024-11-12

## 2024-11-12 NOTE — TELEPHONE ENCOUNTER
Patient is on day 2 of doxycycline but she left work early today because she got the chills. When she got home she had a fever of 101.2. Asked what she should take to reduce the fever.

## 2024-11-12 NOTE — TELEPHONE ENCOUNTER
She can alternate Tylenol and Motrin. If she continues to have the fever 24 hrs from now I would like to re-evaluate her in the office.

## 2024-11-20 ENCOUNTER — OFFICE VISIT (OUTPATIENT)
Dept: FAMILY MEDICINE CLINIC | Facility: CLINIC | Age: 54
End: 2024-11-20
Payer: COMMERCIAL

## 2024-11-20 VITALS
HEIGHT: 63 IN | TEMPERATURE: 97.2 F | WEIGHT: 94 LBS | OXYGEN SATURATION: 97 % | HEART RATE: 68 BPM | SYSTOLIC BLOOD PRESSURE: 106 MMHG | BODY MASS INDEX: 16.66 KG/M2 | DIASTOLIC BLOOD PRESSURE: 70 MMHG

## 2024-11-20 DIAGNOSIS — J06.9 UPPER RESPIRATORY TRACT INFECTION, UNSPECIFIED TYPE: Primary | ICD-10-CM

## 2024-11-20 DIAGNOSIS — B37.0 THRUSH: ICD-10-CM

## 2024-11-20 PROCEDURE — 99213 OFFICE O/P EST LOW 20 MIN: CPT | Performed by: FAMILY MEDICINE

## 2024-11-20 RX ORDER — NYSTATIN 100000 [USP'U]/ML
500000 SUSPENSION ORAL 4 TIMES DAILY
Qty: 600 ML | Refills: 9 | Status: SHIPPED | OUTPATIENT
Start: 2024-11-20

## 2024-11-20 NOTE — PROGRESS NOTES
"Name: Pam Grey      : 1970      MRN: [id number]  Encounter Provider: Pierce Torres MD  Encounter Date: 2024   Encounter department: [facility]  :  Assessment & Plan  Upper respiratory tract infection, unspecified type  Continue with supportive care. Tylenol, Motrin, Mucinex, honey, cough drops  Stay hydrated and pace self  Call with new/worsening symptoms        Thrush    Orders:    nystatin (MYCOSTATIN) 500,000 units/5 mL suspension; Apply 5 mL (500,000 Units total) to the mouth or throat 4 (four) times a day           History of Present Illness     Cough  Associated symptoms include postnasal drip. Pertinent negatives include no chest pain, chills, fever, headaches, myalgias, rash, sore throat or shortness of breath.     Review of Systems   Constitutional:  Positive for fatigue. Negative for chills and fever.   HENT:  Positive for congestion and postnasal drip. Negative for sore throat.    Eyes:  Negative for pain and visual disturbance.   Respiratory:  Positive for cough. Negative for shortness of breath.    Cardiovascular:  Negative for chest pain and palpitations.   Gastrointestinal:  Negative for abdominal pain and nausea.   Genitourinary:  Negative for dysuria.   Musculoskeletal:  Negative for arthralgias and myalgias.   Skin:  Negative for rash and wound.   Neurological:  Negative for dizziness and headaches.   All other systems reviewed and are negative.    Medical History Reviewed by provider this encounter:     .     Objective   /70   Pulse 68   Temp (!) 97.2 °F (36.2 °C)   Ht 5' 3\" (1.6 m)   Wt 42.6 kg (94 lb)   LMP 2021   SpO2 97%   BMI 16.65 kg/m²      Physical Exam  Vitals and nursing note reviewed.   Constitutional:       General: She is not in acute distress.     Appearance: She is well-developed. She is not ill-appearing or toxic-appearing.   HENT:      Head: Normocephalic and atraumatic.      Right Ear: Tympanic membrane, " ear canal and external ear normal.      Left Ear: Tympanic membrane, ear canal and external ear normal.      Nose: Nose normal.      Mouth/Throat:      Mouth: Mucous membranes are moist.      Pharynx: Posterior oropharyngeal erythema present. No oropharyngeal exudate.      Comments: Thrush on soft palate, tongue  Eyes:      Conjunctiva/sclera: Conjunctivae normal.   Neck:      Trachea: No tracheal deviation.   Cardiovascular:      Rate and Rhythm: Normal rate and regular rhythm.      Pulses: Normal pulses.      Heart sounds: Normal heart sounds.   Pulmonary:      Effort: Pulmonary effort is normal.      Breath sounds: Normal breath sounds. No wheezing, rhonchi or rales.   Abdominal:      Tenderness: There is no abdominal tenderness.   Lymphadenopathy:      Cervical: No cervical adenopathy.   Skin:     General: Skin is warm and dry.      Capillary Refill: Capillary refill takes less than 2 seconds.      Findings: No rash.   Neurological:      Mental Status: She is alert.      Cranial Nerves: No cranial nerve deficit.

## 2024-11-22 ENCOUNTER — PATIENT MESSAGE (OUTPATIENT)
Dept: FAMILY MEDICINE CLINIC | Facility: CLINIC | Age: 54
End: 2024-11-22

## 2024-11-22 DIAGNOSIS — J01.00 ACUTE NON-RECURRENT MAXILLARY SINUSITIS: Primary | ICD-10-CM

## 2024-11-22 DIAGNOSIS — J32.9 RECURRENT SINUSITIS: Primary | ICD-10-CM

## 2024-11-22 RX ORDER — CEFUROXIME AXETIL 500 MG/1
500 TABLET ORAL EVERY 12 HOURS SCHEDULED
Qty: 20 TABLET | Refills: 0 | Status: SHIPPED | OUTPATIENT
Start: 2024-11-22 | End: 2024-12-02

## 2025-01-08 NOTE — PATIENT COMMUNICATION
Note patient has been on Augmentin, then doxycycline, the Ceftin in the last 3 months.     Call and offer appt tomorrow, SD appt.

## 2025-01-09 ENCOUNTER — OFFICE VISIT (OUTPATIENT)
Dept: FAMILY MEDICINE CLINIC | Facility: CLINIC | Age: 55
End: 2025-01-09
Payer: COMMERCIAL

## 2025-01-09 VITALS
HEART RATE: 72 BPM | BODY MASS INDEX: 17.19 KG/M2 | DIASTOLIC BLOOD PRESSURE: 66 MMHG | HEIGHT: 63 IN | WEIGHT: 97 LBS | SYSTOLIC BLOOD PRESSURE: 116 MMHG | OXYGEN SATURATION: 96 % | TEMPERATURE: 97.4 F

## 2025-01-09 DIAGNOSIS — J01.01 ACUTE RECURRENT MAXILLARY SINUSITIS: Primary | ICD-10-CM

## 2025-01-09 LAB
SARS-COV-2 AG UPPER RESP QL IA: NEGATIVE
SL AMB POCT RAPID FLU A: NORMAL
SL AMB POCT RAPID FLU B: NORMAL
VALID CONTROL: NORMAL

## 2025-01-09 PROCEDURE — 87811 SARS-COV-2 COVID19 W/OPTIC: CPT | Performed by: FAMILY MEDICINE

## 2025-01-09 PROCEDURE — 87804 INFLUENZA ASSAY W/OPTIC: CPT | Performed by: FAMILY MEDICINE

## 2025-01-09 PROCEDURE — 99213 OFFICE O/P EST LOW 20 MIN: CPT | Performed by: FAMILY MEDICINE

## 2025-01-09 RX ORDER — BROMPHENIRAMINE MALEATE, PSEUDOEPHEDRINE HYDROCHLORIDE, AND DEXTROMETHORPHAN HYDROBROMIDE 2; 30; 10 MG/5ML; MG/5ML; MG/5ML
5 SYRUP ORAL 4 TIMES DAILY PRN
Qty: 120 ML | Refills: 0 | Status: SHIPPED | OUTPATIENT
Start: 2025-01-09

## 2025-01-09 RX ORDER — BENZONATATE 100 MG/1
100 CAPSULE ORAL 3 TIMES DAILY PRN
Qty: 20 CAPSULE | Refills: 0 | Status: SHIPPED | OUTPATIENT
Start: 2025-01-09

## 2025-01-09 RX ORDER — CEFUROXIME AXETIL 500 MG/1
500 TABLET ORAL EVERY 12 HOURS SCHEDULED
Qty: 14 TABLET | Refills: 0 | Status: SHIPPED | OUTPATIENT
Start: 2025-01-09 | End: 2025-01-16

## 2025-01-10 NOTE — PROGRESS NOTES
Name: Pam Grey      : 1970      MRN: [id number]  Encounter Provider: Pierce Torres MD  Encounter Date: 2025   Encounter department: [facility]  :  Assessment & Plan  Acute recurrent maxillary sinusitis  Rapid testing negative  We discussed the possibility this could be viral, but if symptoms persist without improvement 7-10 days it's likely bacterial again. Recommend ENT follow up for recurrent sinusitis.   Counseled the patient regarding supportive care.  They are to call or return to the office if not improving.   Orders:  •  POCT Rapid Covid Ag  •  POCT rapid flu A and B  •  benzonatate (TESSALON PERLES) 100 mg capsule; Take 1 capsule (100 mg total) by mouth 3 (three) times a day as needed for cough  •  brompheniramine-pseudoephedrine-DM 30-2-10 MG/5ML syrup; Take 5 mL by mouth 4 (four) times a day as needed for congestion or cough  •  cefuroxime (CEFTIN) 500 mg tablet; Take 1 tablet (500 mg total) by mouth every 12 (twelve) hours for 7 days           History of Present Illness   Chief Complaint   Patient presents with   • Sinus Problem     Started Monday- a lot of congestion, barking cough, chest soreness/ pain when breathing due to cough. Fever yesterday 99. Headache, chills, easily fatigued.       Sinus Problem  Associated symptoms include chills, congestion, coughing, sinus pressure and a sore throat. Pertinent negatives include no headaches or shortness of breath.    Patient reports 4 days of congestion, cough, chest congestion, feeling warm, fatigue, headache, chills. No T>100.4F. denies nausea, diarrhea, vomiting, rash. Is using nasal saline and silver spray.     Review of Systems   Constitutional:  Positive for activity change, chills and fatigue. Negative for fever.   HENT:  Positive for congestion, rhinorrhea, sinus pressure, sinus pain and sore throat.    Eyes:  Negative for pain and visual disturbance.   Respiratory:  Positive for cough. Negative  "for shortness of breath.    Cardiovascular:  Negative for chest pain and palpitations.   Gastrointestinal:  Negative for abdominal pain and nausea.   Genitourinary:  Negative for dysuria.   Musculoskeletal:  Positive for myalgias. Negative for arthralgias.   Skin:  Negative for rash and wound.   Neurological:  Negative for dizziness and headaches.   All other systems reviewed and are negative.      Objective   /66   Pulse 72   Temp (!) 97.4 °F (36.3 °C)   Ht 5' 3\" (1.6 m)   Wt 44 kg (97 lb)   LMP 07/02/2021   SpO2 96%   BMI 17.18 kg/m²      Physical Exam  Vitals and nursing note reviewed.   Constitutional:       General: She is not in acute distress.     Appearance: She is well-developed. She is ill-appearing.   HENT:      Head: Normocephalic and atraumatic.      Right Ear: Tympanic membrane, ear canal and external ear normal.      Left Ear: Tympanic membrane, ear canal and external ear normal.      Ears:      Comments: Serous effusion bilaterally     Nose: Nose normal.      Mouth/Throat:      Mouth: Mucous membranes are moist.      Pharynx: Posterior oropharyngeal erythema present. No oropharyngeal exudate.   Eyes:      Conjunctiva/sclera: Conjunctivae normal.   Neck:      Trachea: No tracheal deviation.   Cardiovascular:      Rate and Rhythm: Normal rate and regular rhythm.      Pulses: Normal pulses.      Heart sounds: Normal heart sounds. No murmur heard.  Pulmonary:      Effort: Pulmonary effort is normal.      Breath sounds: Normal breath sounds. No wheezing, rhonchi or rales.   Abdominal:      Tenderness: There is no abdominal tenderness.   Lymphadenopathy:      Cervical: No cervical adenopathy.   Skin:     General: Skin is warm and dry.      Capillary Refill: Capillary refill takes less than 2 seconds.      Findings: No rash.   Neurological:      Mental Status: She is alert.      Cranial Nerves: No cranial nerve deficit.         "

## 2025-03-14 ENCOUNTER — CLINICAL SUPPORT (OUTPATIENT)
Dept: FAMILY MEDICINE CLINIC | Facility: CLINIC | Age: 55
End: 2025-03-14
Payer: COMMERCIAL

## 2025-03-14 DIAGNOSIS — Z23 ENCOUNTER FOR IMMUNIZATION: Primary | ICD-10-CM

## 2025-03-14 PROCEDURE — 90471 IMMUNIZATION ADMIN: CPT

## 2025-03-14 PROCEDURE — 90750 HZV VACC RECOMBINANT IM: CPT

## 2025-04-18 DIAGNOSIS — T78.2XXA ANAPHYLAXIS, INITIAL ENCOUNTER: ICD-10-CM

## 2025-04-18 RX ORDER — EPINEPHRINE 0.3 MG/.3ML
0.3 INJECTION SUBCUTANEOUS ONCE
Qty: 0.6 ML | Refills: 0 | Status: SHIPPED | OUTPATIENT
Start: 2025-04-18 | End: 2025-04-18

## 2025-05-15 ENCOUNTER — OFFICE VISIT (OUTPATIENT)
Dept: FAMILY MEDICINE CLINIC | Facility: CLINIC | Age: 55
End: 2025-05-15
Payer: COMMERCIAL

## 2025-05-15 VITALS
HEART RATE: 77 BPM | DIASTOLIC BLOOD PRESSURE: 80 MMHG | HEIGHT: 63 IN | WEIGHT: 96 LBS | OXYGEN SATURATION: 98 % | BODY MASS INDEX: 17.01 KG/M2 | SYSTOLIC BLOOD PRESSURE: 110 MMHG | TEMPERATURE: 97.1 F

## 2025-05-15 DIAGNOSIS — J01.00 ACUTE NON-RECURRENT MAXILLARY SINUSITIS: Primary | ICD-10-CM

## 2025-05-15 PROCEDURE — 99213 OFFICE O/P EST LOW 20 MIN: CPT | Performed by: FAMILY MEDICINE

## 2025-05-15 RX ORDER — SODIUM FLUORIDE AND POTASSIUM NITRATE 57.5; 5.8 MG/ML; MG/ML
GEL DENTAL
COMMUNITY
Start: 2025-04-19

## 2025-05-15 NOTE — PROGRESS NOTES
Name: Pam Grey      : 1970      MRN: [id number]  Encounter Provider: Pierce Torres MD  Encounter Date: 5/15/2025   Encounter department: [facility]  :  Assessment & Plan  Acute non-recurrent maxillary sinusitis  May have had conjunctivitis which is now cleared  Counseled the patient regarding supportive care.  They are to call or return to the office if not improving.     When she initially called, she had some signs of possible pre/septal cellulitis and I advised she seek immediate medical attn, but she declined and followed up with me 48 hrs later. I reviewed risks with her of declining medical advice (as the delay in care could have contributed to significant morbidity). She voiced understanding  Orders:  •  amoxicillin-clavulanate (AUGMENTIN) 875-125 mg per tablet; Take 1 tablet by mouth every 12 (twelve) hours for 7 days           History of Present Illness   Chief Complaint   Patient presents with   • Facial Pain     Monday draining, green discharge, itchy and pain to touch, sinus pressure       HPI Patient presents with left eye green discharge. It also felt itchy and painful. It seemed like it was drying up but then continued to tear/drain, clear. She felt increased puffiness in the left cheek and has associated bilateral sinus pressure. The puffiness is itchy/sore. She is using lubricating eye drops.   She has been offered treatment (plugs) for dry eye but is unsure if she wants to pursue this.     Review of Systems   Constitutional:  Negative for chills and fever.   HENT:  Positive for facial swelling and sinus pressure. Negative for congestion, sinus pain and sore throat.    Eyes:  Positive for pain, discharge and itching. Negative for visual disturbance.   Respiratory:  Negative for cough and shortness of breath.    Cardiovascular:  Negative for chest pain and palpitations.   Gastrointestinal:  Negative for abdominal pain and nausea.   Genitourinary:   "Negative for dysuria.   Musculoskeletal:  Negative for arthralgias and myalgias.   Skin:  Negative for rash and wound.   Neurological:  Negative for dizziness and headaches.   All other systems reviewed and are negative.      Objective   /80 (BP Location: Right arm, Patient Position: Sitting, Cuff Size: Standard)   Pulse 77   Temp (!) 97.1 °F (36.2 °C) (Temporal)   Ht 5' 3\" (1.6 m)   Wt 43.5 kg (96 lb)   LMP 07/02/2021   SpO2 98%   BMI 17.01 kg/m²      Physical Exam  Vitals and nursing note reviewed.   Constitutional:       General: She is not in acute distress.     Appearance: She is well-developed.   HENT:      Head: Normocephalic and atraumatic.      Right Ear: External ear normal.      Left Ear: External ear normal.      Nose: Nose normal.     Eyes:      General: Lids are normal. Lids are everted, no foreign bodies appreciated.         Right eye: No foreign body or discharge.         Left eye: No foreign body or discharge.      Extraocular Movements: Extraocular movements intact.      Conjunctiva/sclera: Conjunctivae normal.      Left eye: Left conjunctiva is not injected. No chemosis or exudate.     Pupils: Pupils are equal, round, and reactive to light.     Neck:      Trachea: No tracheal deviation.   Pulmonary:      Effort: Pulmonary effort is normal.   Abdominal:      Tenderness: There is no abdominal tenderness.     Skin:     General: Skin is warm and dry.      Capillary Refill: Capillary refill takes less than 2 seconds.      Findings: No rash.     Neurological:      Mental Status: She is alert.      Cranial Nerves: No cranial nerve deficit.       "

## 2025-06-03 ENCOUNTER — PATIENT MESSAGE (OUTPATIENT)
Dept: FAMILY MEDICINE CLINIC | Facility: CLINIC | Age: 55
End: 2025-06-03